# Patient Record
Sex: FEMALE | Race: WHITE | NOT HISPANIC OR LATINO | URBAN - METROPOLITAN AREA
[De-identification: names, ages, dates, MRNs, and addresses within clinical notes are randomized per-mention and may not be internally consistent; named-entity substitution may affect disease eponyms.]

---

## 2020-02-03 RX ORDER — APIXABAN 2.5 MG/1
1 TABLET, FILM COATED ORAL
Qty: 60 | Refills: 0
Start: 2020-02-03 | End: 2020-03-03

## 2020-02-24 ENCOUNTER — INPATIENT (INPATIENT)
Facility: HOSPITAL | Age: 55
LOS: 4 days | Discharge: HOME | DRG: 863 | End: 2020-02-29
Attending: INTERNAL MEDICINE | Admitting: HOSPITALIST
Payer: COMMERCIAL

## 2020-02-24 VITALS
TEMPERATURE: 99 F | SYSTOLIC BLOOD PRESSURE: 177 MMHG | DIASTOLIC BLOOD PRESSURE: 96 MMHG | RESPIRATION RATE: 17 BRPM | OXYGEN SATURATION: 97 % | HEIGHT: 63 IN | HEART RATE: 92 BPM | WEIGHT: 195.99 LBS

## 2020-02-24 LAB
ALBUMIN SERPL ELPH-MCNC: 4 G/DL — SIGNIFICANT CHANGE UP (ref 3.3–5)
ALP SERPL-CCNC: 75 U/L — SIGNIFICANT CHANGE UP (ref 40–120)
ALT FLD-CCNC: 16 U/L — SIGNIFICANT CHANGE UP (ref 10–45)
ANION GAP SERPL CALC-SCNC: 13 MMOL/L — SIGNIFICANT CHANGE UP (ref 5–17)
APTT BLD: 28.9 SEC — SIGNIFICANT CHANGE UP (ref 27.5–36.3)
AST SERPL-CCNC: 29 U/L — SIGNIFICANT CHANGE UP (ref 10–40)
BASE EXCESS BLDV CALC-SCNC: 3.6 MMOL/L — HIGH (ref -2–2)
BASOPHILS # BLD AUTO: 0.05 K/UL — SIGNIFICANT CHANGE UP (ref 0–0.2)
BASOPHILS NFR BLD AUTO: 0.4 % — SIGNIFICANT CHANGE UP (ref 0–2)
BILIRUB SERPL-MCNC: 0.3 MG/DL — SIGNIFICANT CHANGE UP (ref 0.2–1.2)
BUN SERPL-MCNC: 16 MG/DL — SIGNIFICANT CHANGE UP (ref 7–23)
CA-I SERPL-SCNC: 1.22 MMOL/L — SIGNIFICANT CHANGE UP (ref 1.12–1.3)
CALCIUM SERPL-MCNC: 9.5 MG/DL — SIGNIFICANT CHANGE UP (ref 8.4–10.5)
CHLORIDE BLDV-SCNC: 105 MMOL/L — SIGNIFICANT CHANGE UP (ref 96–108)
CHLORIDE SERPL-SCNC: 101 MMOL/L — SIGNIFICANT CHANGE UP (ref 96–108)
CO2 BLDV-SCNC: 30 MMOL/L — SIGNIFICANT CHANGE UP (ref 22–30)
CO2 SERPL-SCNC: 24 MMOL/L — SIGNIFICANT CHANGE UP (ref 22–31)
CREAT SERPL-MCNC: 0.69 MG/DL — SIGNIFICANT CHANGE UP (ref 0.5–1.3)
EOSINOPHIL # BLD AUTO: 0.2 K/UL — SIGNIFICANT CHANGE UP (ref 0–0.5)
EOSINOPHIL NFR BLD AUTO: 1.7 % — SIGNIFICANT CHANGE UP (ref 0–6)
GAS PNL BLDV: 141 MMOL/L — SIGNIFICANT CHANGE UP (ref 135–145)
GAS PNL BLDV: SIGNIFICANT CHANGE UP
GAS PNL BLDV: SIGNIFICANT CHANGE UP
GLUCOSE BLDV-MCNC: 114 MG/DL — HIGH (ref 70–99)
GLUCOSE SERPL-MCNC: 118 MG/DL — HIGH (ref 70–99)
HCO3 BLDV-SCNC: 29 MMOL/L — SIGNIFICANT CHANGE UP (ref 21–29)
HCT VFR BLD CALC: 42.1 % — SIGNIFICANT CHANGE UP (ref 34.5–45)
HCT VFR BLDA CALC: 44 % — SIGNIFICANT CHANGE UP (ref 39–50)
HGB BLD CALC-MCNC: 14.5 G/DL — SIGNIFICANT CHANGE UP (ref 11.5–15.5)
HGB BLD-MCNC: 14 G/DL — SIGNIFICANT CHANGE UP (ref 11.5–15.5)
IMM GRANULOCYTES NFR BLD AUTO: 0.3 % — SIGNIFICANT CHANGE UP (ref 0–1.5)
INR BLD: 0.98 RATIO — SIGNIFICANT CHANGE UP (ref 0.88–1.16)
LACTATE BLDV-MCNC: 0.9 MMOL/L — SIGNIFICANT CHANGE UP (ref 0.7–2)
LYMPHOCYTES # BLD AUTO: 25 % — SIGNIFICANT CHANGE UP (ref 13–44)
LYMPHOCYTES # BLD AUTO: 3 K/UL — SIGNIFICANT CHANGE UP (ref 1–3.3)
MCHC RBC-ENTMCNC: 28.4 PG — SIGNIFICANT CHANGE UP (ref 27–34)
MCHC RBC-ENTMCNC: 33.3 GM/DL — SIGNIFICANT CHANGE UP (ref 32–36)
MCV RBC AUTO: 85.4 FL — SIGNIFICANT CHANGE UP (ref 80–100)
MONOCYTES # BLD AUTO: 0.89 K/UL — SIGNIFICANT CHANGE UP (ref 0–0.9)
MONOCYTES NFR BLD AUTO: 7.4 % — SIGNIFICANT CHANGE UP (ref 2–14)
NEUTROPHILS # BLD AUTO: 7.83 K/UL — HIGH (ref 1.8–7.4)
NEUTROPHILS NFR BLD AUTO: 65.2 % — SIGNIFICANT CHANGE UP (ref 43–77)
NRBC # BLD: 0 /100 WBCS — SIGNIFICANT CHANGE UP (ref 0–0)
PCO2 BLDV: 48 MMHG — SIGNIFICANT CHANGE UP (ref 35–50)
PH BLDV: 7.4 — SIGNIFICANT CHANGE UP (ref 7.35–7.45)
PLATELET # BLD AUTO: 279 K/UL — SIGNIFICANT CHANGE UP (ref 150–400)
PO2 BLDV: 28 MMHG — SIGNIFICANT CHANGE UP (ref 25–45)
POTASSIUM BLDV-SCNC: 4 MMOL/L — SIGNIFICANT CHANGE UP (ref 3.5–5.3)
POTASSIUM SERPL-MCNC: 5.3 MMOL/L — SIGNIFICANT CHANGE UP (ref 3.5–5.3)
POTASSIUM SERPL-SCNC: 5.3 MMOL/L — SIGNIFICANT CHANGE UP (ref 3.5–5.3)
PROT SERPL-MCNC: 7.5 G/DL — SIGNIFICANT CHANGE UP (ref 6–8.3)
PROTHROM AB SERPL-ACNC: 11.3 SEC — SIGNIFICANT CHANGE UP (ref 10–12.9)
RBC # BLD: 4.93 M/UL — SIGNIFICANT CHANGE UP (ref 3.8–5.2)
RBC # FLD: 12.2 % — SIGNIFICANT CHANGE UP (ref 10.3–14.5)
SAO2 % BLDV: 49 % — LOW (ref 67–88)
SODIUM SERPL-SCNC: 138 MMOL/L — SIGNIFICANT CHANGE UP (ref 135–145)
WBC # BLD: 12.01 K/UL — HIGH (ref 3.8–10.5)
WBC # FLD AUTO: 12.01 K/UL — HIGH (ref 3.8–10.5)

## 2020-02-24 PROCEDURE — 93971 EXTREMITY STUDY: CPT | Mod: 26,RT

## 2020-02-24 PROCEDURE — 99285 EMERGENCY DEPT VISIT HI MDM: CPT

## 2020-02-24 RX ADMIN — Medication 100 MILLIGRAM(S): at 22:17

## 2020-02-24 NOTE — ED PROVIDER NOTE - PROGRESS NOTE DETAILS
Patient signed out to me by the prior attending.  The patient's disposition was discussed with the treating team and agreed upon.  Jose Alcantara M.D. (attending) will admit for cellulitis and dvt.  Dr. Norton aware of patient and received presentation of patient.  Based on patient's history and physical exam, as well as the results of today's workup, I feel that patient warrants admission to the hospital for further workup/evaluation and continued management. I discussed the findings of today's workup with the patient and addressed the patient's questions and concerns. The patient was agreeable with admission. Our team spoke with the inpatient receiving team who accepted the patient for admission and subsequently took over the patient's care at the time of admission.

## 2020-02-24 NOTE — ED PROVIDER NOTE - OBJECTIVE STATEMENT
55yo F with PMH HTN, ovarian CA, breast CA, Hashimoto's, recent PSH of RLE varicose vein surgery (2/19/2020) presents to the ED c/o worsening RLE redness, pain, and swelling since procedure. + associated chills today. Surgery conducted by Dr. Hao Rey in [place]. Pt had US conducted at [facility] yesterday that showed superficial thrombophlebitis, no DVT. Went to PMD and was started on Keflex 4x daily yesterday. Has been taking Ibuprofen and 325mg aspirin. Denies fever, n/v, CP, palpitations, SOB, numbness/tingling.   PMD: Sukhdev Stephenson 55yo F with PMH HTN, ovarian CA, breast CA, Hashimoto's, recent PSH of RLE varicose vein surgery (2/19/2020) presents to the ED c/o worsening RLE redness, pain, and swelling since procedure. + associated chills today. Surgery conducted by Dr. Hao Rey in [place]. Pt had US conducted at [facility] yesterday that showed superficial thrombophlebitis, no DVT. Went to PMD and was started on Keflex 4x daily yesterday. Has been taking Ibuprofen and took 325mg aspirin today. Denies fever, n/v, CP, palpitations, SOB, numbness/tingling.   PMD: Sukhdev Stephenson

## 2020-02-24 NOTE — ED ADULT NURSE NOTE - OBJECTIVE STATEMENT
54yr old female w/ pmhx of Ovarian CA, Breast CA, HTN, hashimoto's on synthroid presents to ED c/o infection. Pt states on wednesday 2/19 pt went to a vascular surgeon to have cosmetic procedure on her varicose veins on her R leg. Pt states ever since she left the surgeons office, she felt pain on her R leg at the site, and then over the week it progressed to redness, swelling, hardened skin, numbness of RLE hot to touch, and increasing pain. Pt states she went to the vascular surgeon today, and was told her leg was fine, but she decided to come to ED to have it assessed. upon assessment, edema, redness, noted on R Extremity, It is hot to the touch, and skin feels hardened upon palpation. Pt denies fevers, but states she did have the chills. Pt denies CP, SOB, NVD, Blurry vision, recent falls or trauma. Pt assessed by MD, bed is lowered and locked., will reassess

## 2020-02-24 NOTE — ED ADULT TRIAGE NOTE - CHIEF COMPLAINT QUOTE
Patient sent from vein clinic "to r/o cellulitis to RLE" - c/o RLE redness, pain and swelling  Patient had varicose vein surgery 2/19/2020

## 2020-02-24 NOTE — ED PROVIDER NOTE - LOWER EXTREMITY EXAM, RIGHT
+ large area of erythema/warmth and induration from R groin extending to proximal calf; no fluctuance, no open wounds, able to range hip/knee/ankle, sensation intact, DP pulse 2+

## 2020-02-24 NOTE — ED ADULT NURSE REASSESSMENT NOTE - NS ED NURSE REASSESS COMMENT FT1
Report received from Juan Jose PRUITT. Aox3, speaking in complete sentences. Unlabored, spontaneous respirations, NAD. R leg, swelling and redness. Awaiting results at this time.

## 2020-02-24 NOTE — ED PROVIDER NOTE - RAPID ASSESSMENT
Rogelio Montes De Oca rapid assessment documentation for Loren Helton (PA-C):    54 year old female with pshx varicose vein surgery (2/19/2020) presents to the ED c/o worsening RLE redness, pain and swelling s/p vein clinic referral to r/o cellulitis. Surgery conducted by Dr. Hao Rey. Pain developed soon after the surgery and swelling developed. Went for a check-up x3 days ago and was told that there were no blood clots but symptoms worsened. US conducted at [facility] on Sunday showed superficial thrombophlebitis, no DVT. Went to vein clinic and started on Keflex yesterday. Has been taking Ibuprofen and 325mg aspirin. Denies fever, chills, n/v. PMD: Sukhdev Stephenson. Rogelio Montes De Oca rapid assessment documentation for Loren Helton (PA-C):    54 year old female with pshx varicose vein surgery (2/19/2020) presents to the ED c/o worsening RLE redness, pain and swelling s/p vein clinic referral to r/o cellulitis. Surgery conducted by Dr. Hao Rey. Pain developed soon after the surgery and swelling developed. Pt had US conducted at [facility] on Sunday showed superficial thrombophlebitis, no DVT. Went to vein clinic and started on Keflex yesterday. Has been taking Ibuprofen for pain and took 325mg aspirin today. Denies fever, chills, n/v. PMD: Sukhdev Stephenson.

## 2020-02-24 NOTE — ED PROVIDER NOTE - ATTENDING CONTRIBUTION TO CARE
54F, pmh htn, ovarian ca, breast ca, s/p RLE varicose vein surgery 2/19, presents with RLE redness, pain, swelling, worsening over last few days. +Chills, no fever. No n/v. Pt saw pmd, who started on Keflex yesterday, has taken 4 doses. Also had US to RLE yesterday which revealed superficial thrombophlebitis. Also taking 325 asa and ibuprofen. Deneis cp, sob, cough, congestion, abd pain. Denies numbness, weakness to extremity.    PE: NAD, NCAT, MMM, Trachea midline, Normal conjunctiva, lungs CTAB, S1/S2 RRR, Normal perfusion, 2+ radial pulses bilat, Abdomen Soft, NTND, No rebound/guarding, No LE edema, No focal motor or sensory deficits. 2+ RLE DP pulse. RLE with +erythema, warmth, induration to R inner thigh extending to proximal calf. No crepitus, neg nikolsky. Sensation grossly intact, motor intact.    C/w developing cellulitis, not initially responding to Keflex. 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 Plan to obtain labs, repeat US RLE. Plan for CDU for IV abx, observation, serial re-evaluation.

## 2020-02-25 DIAGNOSIS — Z02.9 ENCOUNTER FOR ADMINISTRATIVE EXAMINATIONS, UNSPECIFIED: ICD-10-CM

## 2020-02-25 DIAGNOSIS — I82.411 ACUTE EMBOLISM AND THROMBOSIS OF RIGHT FEMORAL VEIN: ICD-10-CM

## 2020-02-25 DIAGNOSIS — I80.10 PHLEBITIS AND THROMBOPHLEBITIS OF UNSPECIFIED FEMORAL VEIN: ICD-10-CM

## 2020-02-25 DIAGNOSIS — L03.90 CELLULITIS, UNSPECIFIED: ICD-10-CM

## 2020-02-25 DIAGNOSIS — Z90.710 ACQUIRED ABSENCE OF BOTH CERVIX AND UTERUS: Chronic | ICD-10-CM

## 2020-02-25 DIAGNOSIS — Z90.12 ACQUIRED ABSENCE OF LEFT BREAST AND NIPPLE: Chronic | ICD-10-CM

## 2020-02-25 DIAGNOSIS — L03.115 CELLULITIS OF RIGHT LOWER LIMB: ICD-10-CM

## 2020-02-25 DIAGNOSIS — I10 ESSENTIAL (PRIMARY) HYPERTENSION: ICD-10-CM

## 2020-02-25 DIAGNOSIS — E03.8 OTHER SPECIFIED HYPOTHYROIDISM: ICD-10-CM

## 2020-02-25 LAB
ANION GAP SERPL CALC-SCNC: 11 MMOL/L — SIGNIFICANT CHANGE UP (ref 5–17)
BASOPHILS # BLD AUTO: 0.02 K/UL — SIGNIFICANT CHANGE UP (ref 0–0.2)
BASOPHILS NFR BLD AUTO: 0.2 % — SIGNIFICANT CHANGE UP (ref 0–2)
BUN SERPL-MCNC: 14 MG/DL — SIGNIFICANT CHANGE UP (ref 7–23)
CALCIUM SERPL-MCNC: 8.9 MG/DL — SIGNIFICANT CHANGE UP (ref 8.4–10.5)
CHLORIDE SERPL-SCNC: 104 MMOL/L — SIGNIFICANT CHANGE UP (ref 96–108)
CO2 SERPL-SCNC: 24 MMOL/L — SIGNIFICANT CHANGE UP (ref 22–31)
CREAT SERPL-MCNC: 0.57 MG/DL — SIGNIFICANT CHANGE UP (ref 0.5–1.3)
EOSINOPHIL # BLD AUTO: 0.15 K/UL — SIGNIFICANT CHANGE UP (ref 0–0.5)
EOSINOPHIL NFR BLD AUTO: 1.6 % — SIGNIFICANT CHANGE UP (ref 0–6)
GLUCOSE SERPL-MCNC: 105 MG/DL — HIGH (ref 70–99)
HCT VFR BLD CALC: 40.9 % — SIGNIFICANT CHANGE UP (ref 34.5–45)
HGB BLD-MCNC: 13.6 G/DL — SIGNIFICANT CHANGE UP (ref 11.5–15.5)
IMM GRANULOCYTES NFR BLD AUTO: 0.3 % — SIGNIFICANT CHANGE UP (ref 0–1.5)
LYMPHOCYTES # BLD AUTO: 2.59 K/UL — SIGNIFICANT CHANGE UP (ref 1–3.3)
LYMPHOCYTES # BLD AUTO: 27.3 % — SIGNIFICANT CHANGE UP (ref 13–44)
MAGNESIUM SERPL-MCNC: 2 MG/DL — SIGNIFICANT CHANGE UP (ref 1.6–2.6)
MCHC RBC-ENTMCNC: 28.5 PG — SIGNIFICANT CHANGE UP (ref 27–34)
MCHC RBC-ENTMCNC: 33.3 GM/DL — SIGNIFICANT CHANGE UP (ref 32–36)
MCV RBC AUTO: 85.7 FL — SIGNIFICANT CHANGE UP (ref 80–100)
MONOCYTES # BLD AUTO: 0.63 K/UL — SIGNIFICANT CHANGE UP (ref 0–0.9)
MONOCYTES NFR BLD AUTO: 6.6 % — SIGNIFICANT CHANGE UP (ref 2–14)
NEUTROPHILS # BLD AUTO: 6.08 K/UL — SIGNIFICANT CHANGE UP (ref 1.8–7.4)
NEUTROPHILS NFR BLD AUTO: 64 % — SIGNIFICANT CHANGE UP (ref 43–77)
NRBC # BLD: 0 /100 WBCS — SIGNIFICANT CHANGE UP (ref 0–0)
PHOSPHATE SERPL-MCNC: 3.7 MG/DL — SIGNIFICANT CHANGE UP (ref 2.5–4.5)
PLATELET # BLD AUTO: 252 K/UL — SIGNIFICANT CHANGE UP (ref 150–400)
POTASSIUM SERPL-MCNC: 4 MMOL/L — SIGNIFICANT CHANGE UP (ref 3.5–5.3)
POTASSIUM SERPL-SCNC: 4 MMOL/L — SIGNIFICANT CHANGE UP (ref 3.5–5.3)
RBC # BLD: 4.77 M/UL — SIGNIFICANT CHANGE UP (ref 3.8–5.2)
RBC # FLD: 12.2 % — SIGNIFICANT CHANGE UP (ref 10.3–14.5)
SODIUM SERPL-SCNC: 139 MMOL/L — SIGNIFICANT CHANGE UP (ref 135–145)
WBC # BLD: 9.5 K/UL — SIGNIFICANT CHANGE UP (ref 3.8–10.5)
WBC # FLD AUTO: 9.5 K/UL — SIGNIFICANT CHANGE UP (ref 3.8–10.5)

## 2020-02-25 PROCEDURE — 73700 CT LOWER EXTREMITY W/O DYE: CPT | Mod: 26,RT

## 2020-02-25 PROCEDURE — 99223 1ST HOSP IP/OBS HIGH 75: CPT

## 2020-02-25 RX ORDER — LEVOTHYROXINE SODIUM 125 MCG
125 TABLET ORAL DAILY
Refills: 0 | Status: DISCONTINUED | OUTPATIENT
Start: 2020-02-25 | End: 2020-02-29

## 2020-02-25 RX ORDER — VANCOMYCIN HCL 1 G
1000 VIAL (EA) INTRAVENOUS EVERY 12 HOURS
Refills: 0 | Status: DISCONTINUED | OUTPATIENT
Start: 2020-02-25 | End: 2020-02-29

## 2020-02-25 RX ORDER — INFLUENZA VIRUS VACCINE 15; 15; 15; 15 UG/.5ML; UG/.5ML; UG/.5ML; UG/.5ML
0.5 SUSPENSION INTRAMUSCULAR ONCE
Refills: 0 | Status: DISCONTINUED | OUTPATIENT
Start: 2020-02-25 | End: 2020-02-29

## 2020-02-25 RX ORDER — ENOXAPARIN SODIUM 100 MG/ML
90 INJECTION SUBCUTANEOUS ONCE
Refills: 0 | Status: COMPLETED | OUTPATIENT
Start: 2020-02-25 | End: 2020-02-25

## 2020-02-25 RX ORDER — APIXABAN 2.5 MG/1
10 TABLET, FILM COATED ORAL EVERY 12 HOURS
Refills: 0 | Status: DISCONTINUED | OUTPATIENT
Start: 2020-02-25 | End: 2020-02-29

## 2020-02-25 RX ADMIN — Medication 250 MILLIGRAM(S): at 18:08

## 2020-02-25 RX ADMIN — APIXABAN 10 MILLIGRAM(S): 2.5 TABLET, FILM COATED ORAL at 18:08

## 2020-02-25 RX ADMIN — Medication 2.5 MILLIGRAM(S): at 06:46

## 2020-02-25 RX ADMIN — ENOXAPARIN SODIUM 90 MILLIGRAM(S): 100 INJECTION SUBCUTANEOUS at 02:51

## 2020-02-25 RX ADMIN — Medication 250 MILLIGRAM(S): at 06:46

## 2020-02-25 RX ADMIN — Medication 125 MICROGRAM(S): at 06:46

## 2020-02-25 RX ADMIN — APIXABAN 10 MILLIGRAM(S): 2.5 TABLET, FILM COATED ORAL at 06:46

## 2020-02-25 NOTE — H&P ADULT - HISTORY OF PRESENT ILLNESS
54F with PMH of HTN, ovarian cancer (s/p total abdominal hysterectomy 2012), breast cancer s/p L mastectomy (2019) p/w RLE swelling and pain. Pt had laser therapy for varicose vein on RLE performed 2/19/20. In the days after, she started having severe pain in the R upper/medial thigh, which intensified over the next day and started radiating down the leg. She also started noticing erythema in the medical aspect of her R thigh which then progressed to swelling and induration. Pain is 10/10 at its worst, feels like a pressure and tearing kind of pain, worse with ambulation or attempted weight bearing; was taking ibuprofen at home for pain, but with minimal to no relief. Has never had similar symptoms in the past. Had an US performed on 2/23/20 which showed superficial thrombophlebitis and went to her PMD, who started stone keflex and aspirin. Pt took 4 doses of the abx, but symptoms continued to worsen and pt started having chills on day of presentation, which prompted ED visit. Denies fevers, CP, SOB, cough, +intermittent low abdominal pain (pt being worked up for cervical lesion at [facility]), no BRBPR/melena, no hematuria.

## 2020-02-25 NOTE — H&P ADULT - NSICDXFAMILYHX_GEN_ALL_CORE_FT
No pertinent family history in first degree relatives
Adequate: hears normal conversation without difficulty

## 2020-02-25 NOTE — H&P ADULT - NSHPREVIEWOFSYSTEMS_GEN_ALL_CORE
REVIEW OF SYSTEMS:    CONSTITUTIONAL: No weakness, no fevers, +chills  EYES/ENT: No visual changes;  no URI sx    NECK: No pain or stiffness  RESPIRATORY: No cough, wheezing, no shortness of breath  CARDIOVASCULAR: No chest pain or palpitations  GASTROINTESTINAL: no nausea, vomiting, +intermittent abdominal pain, no BRBPR  GENITOURINARY: no hematuria, no dysuria  NEUROLOGICAL: no numbness, no headaches, no confusion   MUSCULOSKELETAL: +RLE pain as per HPI,   SKIN: RLE redness + warmth   PSYCH: no anxiety, depression  HEME: no gum bleeding, no bruising

## 2020-02-25 NOTE — H&P ADULT - PROBLEM SELECTOR PLAN 2
pt with new DVT in RLE along saphenous extending to common femoral  s/p lovenox in ED, pt prefers oral AC - will start on eliquis now   pt with hx of uterine and breast ca and being worked up for cervical lesion at OSH; if possibly malignancy related, would be preferable to change to lovenox

## 2020-02-25 NOTE — H&P ADULT - NSHPLABSRESULTS_GEN_ALL_CORE
Labs, imaging personally reviewed and interpreted by me.                          14.0   12.01 )-----------( 279      ( 24 Feb 2020 20:12 )             42.1     02-24    138  |  101  |  16  ----------------------------<  118<H>  5.3   |  24  |  0.69    Ca    9.5      24 Feb 2020 20:12    TPro  7.5  /  Alb  4.0  /  TBili  0.3  /  DBili  x   /  AST  29  /  ALT  16  /  AlkPhos  75  02-24      PT/INR - ( 24 Feb 2020 20:12 )   PT: 11.3 sec;   INR: 0.98 ratio    PTT - ( 24 Feb 2020 20:12 )  PTT:28.9 sec      < from: [facility] Duplex Lower Ext Vein Scan, Right (02.24.20 @ 21:31) >  Occlusive thrombosis of the greater saphenous vein, extends to the junction with the common femoral vein..  Right femoral, and popliteal veins show normal compression with normal color and spectral Doppler waveform and normal response to augmentation. Visualized portions of the posterior tibialand peroneal veins show normal color and spectral flow.     IMPRESSION:  1.  Occlusive thrombosis of the greater saphenous vein extends to the junction with the common femoral vein.

## 2020-02-25 NOTE — ED ADULT NURSE REASSESSMENT NOTE - NS ED NURSE REASSESS COMMENT FT1
Results reviewed by MD Handley. Awaiting admission to hospital at this time, pt aware of plan of care at this time.

## 2020-02-25 NOTE — H&P ADULT - PROBLEM SELECTOR PLAN 1
pt with warmth, pain and erythema along medial aspect of RLE, along with area of DVT  mild leukocytosis and tachycardic on arrival, c/f cellulitis - on keflex as outpt without improvement   will place on vancomycin for now, monitor vanco levels every 4 doses   area demarcated, c/t monitor site

## 2020-02-25 NOTE — CHART NOTE - NSCHARTNOTEFT_GEN_A_CORE
Admitted overnight by hospitalist service   ID evaluation called  Vascular surgery evaluation called.  Dustin Gomez MD pager [phone]

## 2020-02-25 NOTE — CONSULT NOTE ADULT - ASSESSMENT
Assessment/Plan: 54y Female presents with right thigh swelling cellulitis in setting of recent varicose vein ablation found to have a right saphenous vein thrombus extending to the CF junction.   Cellulitis appears to be improving on antibiotics.   Patient already started on Eliquis.    - Continue anticoagulation given that the thrombus extends to the junction into the deep venous system  - Antibiotics per ID  - Can follow up with Dr. Isidro if she no longer wishes to see her own vascular surgeon     Pager [phone]

## 2020-02-25 NOTE — CONSULT NOTE ADULT - SUBJECTIVE AND OBJECTIVE BOX
HPI:   Patient is a 54y female with history of breast and ovarian cancer not active, she underwent laser varicose vein removal a week ago at an outpt vein center. She developed pain and redness of the right thigh very soon after, no improvement after a day of keflex hence came to the hospital yesterday. She was found to have a dvt of the greater saphenous vein. She has no fever but has chills. No cat or dog exposure. Her leg was fine prior to the laser procedure.     REVIEW OF SYSTEMS:  All other review of systems negative (Comprehensive ROS)    PAST MEDICAL & SURGICAL HISTORY:  Uterine cancer  Breast cancer  Hypothyroidism  HTN (hypertension)  S/P mastectomy, left  S/P hysterectomy      Allergies    No Known Allergies    Intolerances        Antimicrobials Day #  :1  vancomycin  IVPB 1000 milliGRAM(s) IV Intermittent every 12 hours    Other Medications:  apixaban 10 milliGRAM(s) Oral every 12 hours  enalapril 2.5 milliGRAM(s) Oral daily  influenza   Vaccine 0.5 milliLiter(s) IntraMuscular once  levothyroxine 125 MICROGram(s) Oral daily      FAMILY HISTORY:  No pertinent family history in first degree relatives      SOCIAL HISTORY:  Smoking: [ ]Yes [ ]No  ETOH: [ ]Yes [ ]No  Drug Use: [ ]Yes [ ]No   [ ] Single[ ]    T(F): 97.3 (02-25-20 @ 17:05), Max: 99 (02-24-20 @ 18:49)  HR: 76 (02-25-20 @ 17:05)  BP: 130/82 (02-25-20 @ 17:05)  RR: 18 (02-25-20 @ 17:05)  SpO2: 96% (02-25-20 @ 17:05)  Wt(kg): --    PHYSICAL EXAM:  General: alert, no acute distress  Eyes:  anicteric, no conjunctival injection, no discharge  Oropharynx: no lesions or injection 	  Neck: supple, without adenopathy  Lungs: clear to auscultation  Heart: regular rate and rhythm; no murmur, rubs or gallops  Abdomen: soft, nondistended, nontender, without mass or organomegaly  Skin: no lesions  Extremities: no clubbing, cyanosis,. right medial  thigh area is red and indurated with one area more swollen and indurated just above the knee. No crepitus, no fluctuance, no blisters, no adenopathy. full rom.   Neurologic: alert, oriented, moves all extremities    LAB RESULTS:                        13.6   9.50  )-----------( 252      ( 25 Feb 2020 05:19 )             40.9     02-25    139  |  104  |  14  ----------------------------<  105<H>  4.0   |  24  |  0.57    Ca    8.9      25 Feb 2020 05:19  Phos  3.7     02-25  Mg     2.0     02-25    TPro  7.5  /  Alb  4.0  /  TBili  0.3  /  DBili  x   /  AST  29  /  ALT  16  /  AlkPhos  75  02-24    LIVER FUNCTIONS - ( 24 Feb 2020 20:12 )  Alb: 4.0 g/dL / Pro: 7.5 g/dL / ALK PHOS: 75 U/L / ALT: 16 U/L / AST: 29 U/L / GGT: x               MICROBIOLOGY:  RECENT CULTURES:        RADIOLOGY REVIEWED:    < from: VA Duplex Lower Ext Vein Scan, Right (02.24.20 @ 21:31) >  IMPRESSION:    1.  Occlusive thrombosis of the greater saphenous vein extends to the junction with the common femoral vein.        < end of copied text >        Impression:  Patient s/p laser varicose vein removal a week ago and then developed redness and pain of the medial thigh very soon after with induration, no better on keflex and now found to have a dvt. She also could have some component of cellulitis too.     Recommendations:  continue iv vancomycin  apply warm compresses to the area  anticoagulation   ct leg  f/u cultures  vascular surgery evaluation  elevate leg

## 2020-02-25 NOTE — H&P ADULT - PROBLEM SELECTOR PLAN 5
1.  Name of PCP: Dr Stephenson  2.  PCP Contacted on Admission: [ ] Y    [ ] N    3.  PCP contacted at Discharge: [ ] Y    [ ] N    [ ] N/A  4.  Post-Discharge Appointment Date and Location:  5.  Summary of Handoff given to PCP:

## 2020-02-25 NOTE — CONSULT NOTE ADULT - SUBJECTIVE AND OBJECTIVE BOX
Vascular Surgery Consult Note  Pager [phone]    HPI:  54F with PMH of HTN, ovarian cancer (s/p total abdominal hysterectomy 2012), breast cancer s/p L mastectomy (2019) presents with RLE swelling and pain in the setting of recent right varicose vein ablation performed on 2/19/20. Vascular surgeon does not belong to [facility]. Following the procedure, she developed redness and pain in her right medial thigh. Was started on Keflex by her PMD, but came to the ED for worsening pain.   Vascular surgery consulted for right saphenous vein thrombus extending to the common femoral vein.       PAST MEDICAL & SURGICAL HISTORY:  Uterine cancer  Breast cancer  Hypothyroidism  HTN (hypertension)  S/P mastectomy, left  S/P hysterectomy      ALLERGIES:  NKA      HOME MEDICATIONS:  aspirin 325 mg oral tablet: 1 tab(s) orally once a day (25 Feb 2020 03:52)  cephalexin 500 mg oral tablet: 1 tab(s) orally 4 times a day (25 Feb 2020 03:52)  enalapril 2.5 mg oral tablet: 1 tab(s) orally once a day (25 Feb 2020 03:52)  Synthroid 125 mcg (0.125 mg) oral tablet: 1 tab(s) orally once a day (25 Feb 2020 03:52)    MEDICATIONS  (STANDING):  apixaban 10 milliGRAM(s) Oral every 12 hours  enalapril 2.5 milliGRAM(s) Oral daily  influenza   Vaccine 0.5 milliLiter(s) IntraMuscular once  levothyroxine 125 MICROGram(s) Oral daily  vancomycin  IVPB 1000 milliGRAM(s) IV Intermittent every 12 hours      SOCIAL HISTORY:  Denies smoking and ETOH use. Lives with family.      FAMILY HISTORY:  No pertinent history in first degree relatives.  ___________________________________________  REVIEW OF SYSTEMS:  Constitutional: No fevers, chills, no recent weight loss  ENMT: No changes in hearing, no changes in vision, no sore throat, no cough  Respiratory: No shortness of breath  Cardiovascular: No chest pain, palpitations  Gastrointestinal: No abdominal pain, no diarrhea/constipation  Genitourinary: No dysuria, frequency, or urgency    Extremities: No joint swelling, no limited range of movement  Neurological: No paresthesia  Skin: No rashes  ___________________________________________  PHYSICAL EXAM:  Vital Signs Last 24 Hrs  T(C): 36.3 (25 Feb 2020 17:05), Max: 36.9 (25 Feb 2020 12:35)  T(F): 97.3 (25 Feb 2020 17:05), Max: 98.4 (25 Feb 2020 12:35)  HR: 76 (25 Feb 2020 17:05) (74 - 85)  BP: 130/82 (25 Feb 2020 17:05) (124/71 - 134/84)  BP(mean): --  RR: 18 (25 Feb 2020 17:05) (16 - 18)  SpO2: 96% (25 Feb 2020 17:05) (94% - 96%)CAPILLARY BLOOD GLUCOSE      General: A&Ox3, NAD.  Neuro: Motor and sensory grossly intact with no focal deficits.  HEENT: Anicteric sclerae.  Respiratory: Unlabored breathing.   CVS: Regular rate and rhythm.  Abdomen: Soft, non-distended, non-tender.   Extremities: Right medial thigh cellulitis and induration receding from marked line. Thigh compartments soft.  Vascular: Right DP/PT pulse.  MSK: Intact ROM.  ____________________________________________  LABS:  CBC Full  -  ( 25 Feb 2020 05:19 )  WBC Count : 9.50 K/uL  RBC Count : 4.77 M/uL  Hemoglobin : 13.6 g/dL  Hematocrit : 40.9 %  Platelet Count - Automated : 252 K/uL  Mean Cell Volume : 85.7 fl  Mean Cell Hemoglobin : 28.5 pg  Mean Cell Hemoglobin Concentration : 33.3 gm/dL  Auto Neutrophil # : 6.08 K/uL  Auto Lymphocyte # : 2.59 K/uL  Auto Monocyte # : 0.63 K/uL  Auto Eosinophil # : 0.15 K/uL  Auto Basophil # : 0.02 K/uL  Auto Neutrophil % : 64.0 %  Auto Lymphocyte % : 27.3 %  Auto Monocyte % : 6.6 %  Auto Eosinophil % : 1.6 %  Auto Basophil % : 0.2 %    02-25    139  |  104  |  14  ----------------------------<  105<H>  4.0   |  24  |  0.57    Ca    8.9      25 Feb 2020 05:19  Phos  3.7     02-25  Mg     2.0     02-25    TPro  7.5  /  Alb  4.0  /  TBili  0.3  /  DBili  x   /  AST  29  /  ALT  16  /  AlkPhos  75  02-24    LIVER FUNCTIONS - ( 24 Feb 2020 20:12 )  Alb: 4.0 g/dL / Pro: 7.5 g/dL / ALK PHOS: 75 U/L / ALT: 16 U/L / AST: 29 U/L / GGT: x           PT/INR - ( 24 Feb 2020 20:12 )   PT: 11.3 sec;   INR: 0.98 ratio         PTT - ( 24 Feb 2020 20:12 )  PTT:28.9 sec

## 2020-02-26 ENCOUNTER — TRANSCRIPTION ENCOUNTER (OUTPATIENT)
Age: 55
End: 2020-02-26

## 2020-02-26 LAB
HCV AB S/CO SERPL IA: 0.2 S/CO — SIGNIFICANT CHANGE UP (ref 0–0.99)
HCV AB SERPL-IMP: SIGNIFICANT CHANGE UP
VANCOMYCIN TROUGH SERPL-MCNC: 5.2 UG/ML — LOW (ref 10–20)

## 2020-02-26 PROCEDURE — 99253 IP/OBS CNSLTJ NEW/EST LOW 45: CPT

## 2020-02-26 RX ORDER — ACETAMINOPHEN 500 MG
650 TABLET ORAL ONCE
Refills: 0 | Status: COMPLETED | OUTPATIENT
Start: 2020-02-26 | End: 2020-02-26

## 2020-02-26 RX ORDER — APIXABAN 2.5 MG/1
2 TABLET, FILM COATED ORAL
Qty: 0 | Refills: 0 | DISCHARGE
Start: 2020-02-26

## 2020-02-26 RX ADMIN — Medication 2.5 MILLIGRAM(S): at 06:41

## 2020-02-26 RX ADMIN — APIXABAN 10 MILLIGRAM(S): 2.5 TABLET, FILM COATED ORAL at 18:12

## 2020-02-26 RX ADMIN — Medication 650 MILLIGRAM(S): at 10:45

## 2020-02-26 RX ADMIN — Medication 250 MILLIGRAM(S): at 06:00

## 2020-02-26 RX ADMIN — Medication 650 MILLIGRAM(S): at 11:25

## 2020-02-26 RX ADMIN — APIXABAN 10 MILLIGRAM(S): 2.5 TABLET, FILM COATED ORAL at 06:41

## 2020-02-26 RX ADMIN — Medication 125 MICROGRAM(S): at 05:59

## 2020-02-26 RX ADMIN — Medication 250 MILLIGRAM(S): at 18:12

## 2020-02-26 NOTE — DISCHARGE NOTE PROVIDER - HOSPITAL COURSE
54F with PMH of HTN, ovarian cancer (s/p total abdominal hysterectomy 2012), breast cancer s/p L mastectomy (2019) p/w RLE swelling and pain. Pt had laser therapy for varicose vein on RLE performed 2/19/20. In the days after, she started having severe pain in the R upper/medial thigh, which intensified over the next day and started radiating down the leg. She also started noticing erythema in the medical aspect of her R thigh which then progressed to swelling and induration. Pain is 10/10 at its worst, feels like a pressure and tearing kind of pain, worse with ambulation or attempted weight bearing; was taking ibuprofen at home for pain, but with minimal to no relief. Has never had similar symptoms in the past. Had an US performed on 2/23/20 which showed superficial thrombophlebitis and went to her PMD, who started stone keflex and aspirin. Pt took 4 doses of the abx, but symptoms continued to worsen and pt started having chills on day of presentation, which prompted ED visit. Denies fevers, CP, SOB, cough, +intermittent low abdominal pain (pt being worked up for cervical lesion at [facility]), no BRBPR/melena, no hematuria.      Admitted with right thigh swelling cellulitis in setting of recent varicose vein ablation found to have a right saphenous vein thrombus extending to the CF junction, associated with right thigh erythema    Seen by vascular    - Patient was on Eliquis post procedure and should continue on it    - Recommend repeat duplex in 1 week to assess DVT    - From vascular standpoint, erythema more likely related to ablation, recommend monitoring off abx    - Patient should follow up with Dr. Isidro in 1 week next Friday if she no longer wishes to see her own vascular surgeon 54F with PMH of HTN, ovarian cancer (s/p total abdominal hysterectomy 2012), breast cancer s/p L mastectomy (2019) p/w RLE swelling and pain. Pt had laser therapy for varicose vein on RLE performed 2/19/20. In the days after, she started having severe pain in the R upper/medial thigh, which intensified over the next day and started radiating down the leg. She also started noticing erythema in the medical aspect of her R thigh which then progressed to swelling and induration. Pain is 10/10 at its worst, feels like a pressure and tearing kind of pain, worse with ambulation or attempted weight bearing; was taking ibuprofen at home for pain, but with minimal to no relief. Has never had similar symptoms in the past. Had an US performed on 2/23/20 which showed superficial thrombophlebitis and went to her PMD, who started stone keflex and aspirin. Pt took 4 doses of the abx, but symptoms continued to worsen and pt started having chills on day of presentation, which prompted ED visit. Denies fevers, CP, SOB, cough, +intermittent low abdominal pain (pt being worked up for cervical lesion at [facility]), no BRBPR/melena, no hematuria.      Admitted with right thigh swelling cellulitis in setting of recent varicose vein ablation found to have a right saphenous vein thrombus extending to the CF junction, associated with right thigh erythema    ID reccs doxycline until 3/5/20    Seen by vascular    - Patient was on Eliquis post procedure and should continue on it    - Recommend repeat duplex in 1 week to assess DVT    - From vascular standpoint, erythema more likely related to ablation, recommend monitoring off abx    - Patient should follow up with Dr. Isidro in 1 week next Friday if she no longer wishes to see her own vascular surgeon

## 2020-02-26 NOTE — PROGRESS NOTE ADULT - ASSESSMENT
54F with PMH of HTN, ovarian cancer (s/p total abdominal hysterectomy 2012), breast cancer s/p L mastectomy (2019) p/w RLE swelling and pain, admitted with acute DVT and overlying cellulitis.     Cellulitis of right lower extremity.   - antibiotics  - ID follow.   - area demarcated, c/t monitor site.     Acute deep vein thrombosis (DVT) of femoral vein of right lower extremity.  - pt with new DVT in RLE along saphenous extending to common femoral  - s/p lovenox in ED, pt prefers oral AC . Continue Eliquis     hx of uterine and breast ca and being worked up for cervical lesion  - MRI Abdomen and Pelvis pending      Hypothyroidism due to Hashimoto's thyroiditis.  - c/w home dose synthroid 125mcg.     Essential hypertension.  - BP currently controlled   - c/w home medications - enalapril 2.5mg daily.   Dustin Gomez MD pager [phone]

## 2020-02-26 NOTE — PROGRESS NOTE ADULT - SUBJECTIVE AND OBJECTIVE BOX
CC: f/u for right leg cellulitis     Patient reports that her right leg pain, swelling & redness is slowly getting better     REVIEW OF SYSTEMS:  All other review of systems negative (Comprehensive ROS)    Antimicrobials Day #  :  2  vancomycin  IVPB 1000 milliGRAM(s) IV Intermittent every 12 hours    Other Medications Reviewed    T(F): 97.7 (02-26-20 @ 04:38), Max: 98.4 (02-25-20 @ 12:35)  HR: 79 (02-26-20 @ 04:38)  BP: 159/90 (02-26-20 @ 04:38)  RR: 18 (02-26-20 @ 04:38)  SpO2: 97% (02-26-20 @ 04:38)  Wt(kg): --    PHYSICAL EXAM:  General: alert, no acute distress  Eyes:  anicteric, no conjunctival injection, no discharge  Neck: supple  Lungs: clear to auscultation  Heart: regular rate and rhythm; no murmurs  Abdomen: soft, nondistended, nontender  Skin: right medial  thigh area is red, indurated & warm just above the knee  Neurologic: alert, oriented, moves all extremities    LAB RESULTS:                        13.6   9.50  )-----------( 252      ( 25 Feb 2020 05:19 )             40.9     02-25    139  |  104  |  14  ----------------------------<  105<H>  4.0   |  24  |  0.57    Ca    8.9      25 Feb 2020 05:19  Phos  3.7     02-25  Mg     2.0     02-25    TPro  7.5  /  Alb  4.0  /  TBili  0.3  /  DBili  x   /  AST  29  /  ALT  16  /  AlkPhos  75  02-24    LIVER FUNCTIONS - ( 24 Feb 2020 20:12 )  Alb: 4.0 g/dL / Pro: 7.5 g/dL / ALK PHOS: 75 U/L / ALT: 16 U/L / AST: 29 U/L / GGT: x             MICROBIOLOGY:  RECENT CULTURES:  02-24 @ 23:02 .Blood Blood     No growth to date.        RADIOLOGY REVIEWED:  CT Lower Extremity No Cont, Right (02.25.20 @ 22:25)   Stranding around the greater saphenous vein which appears expanded, consistent with the history of occlusive thrombus    VA Duplex Lower Ext Vein Scan, Right (02.24.20 @ 21:31)   Occlusive thrombosis of the greater saphenous vein extends to the junction with the common femoral vein.

## 2020-02-26 NOTE — PROGRESS NOTE ADULT - SUBJECTIVE AND OBJECTIVE BOX
VASCULAR SURGERY DAILY PROGRESS NOTE:    Subjective:  Patient seen and examined. Reports pain is improved.     Exam:  Gen: NAD, resting in bed  Resp: Airway patent, non-labored respirations  Abd: Soft, ND, NT  Ext: WWP, RLE medial thigh erythematous, tender, 2+ DP/PT      Vital Signs Last 24 Hrs  T(C): 36.5 (26 Feb 2020 04:38), Max: 36.9 (25 Feb 2020 12:35)  T(F): 97.7 (26 Feb 2020 04:38), Max: 98.4 (25 Feb 2020 12:35)  HR: 79 (26 Feb 2020 04:38) (76 - 85)  BP: 159/90 (26 Feb 2020 04:38) (124/71 - 159/90)  BP(mean): --  RR: 18 (26 Feb 2020 04:38) (17 - 18)  SpO2: 97% (26 Feb 2020 04:38) (95% - 97%)    I&O's Detail      Daily Height in cm: 160.02 (25 Feb 2020 17:05)    Daily     MEDICATIONS  (STANDING):  apixaban 10 milliGRAM(s) Oral every 12 hours  enalapril 2.5 milliGRAM(s) Oral daily  influenza   Vaccine 0.5 milliLiter(s) IntraMuscular once  levothyroxine 125 MICROGram(s) Oral daily  vancomycin  IVPB 1000 milliGRAM(s) IV Intermittent every 12 hours    MEDICATIONS  (PRN):      LABS:                        13.6   9.50  )-----------( 252      ( 25 Feb 2020 05:19 )             40.9     02-25    139  |  104  |  14  ----------------------------<  105<H>  4.0   |  24  |  0.57    Ca    8.9      25 Feb 2020 05:19  Phos  3.7     02-25  Mg     2.0     02-25    TPro  7.5  /  Alb  4.0  /  TBili  0.3  /  DBili  x   /  AST  29  /  ALT  16  /  AlkPhos  75  02-24    PT/INR - ( 24 Feb 2020 20:12 )   PT: 11.3 sec;   INR: 0.98 ratio         PTT - ( 24 Feb 2020 20:12 )  PTT:28.9 sec

## 2020-02-26 NOTE — DISCHARGE NOTE PROVIDER - NSDCCPCAREPLAN_GEN_ALL_CORE_FT
PRINCIPAL DISCHARGE DIAGNOSIS  Diagnosis: Cellulitis  Assessment and Plan of Treatment: Take antibotics as directed  Follow-up with your Primary Care Doctor      SECONDARY DISCHARGE DIAGNOSES  Diagnosis: Acute deep vein thrombosis (DVT) of femoral vein of right lower extremity  Assessment and Plan of Treatment: Take eliquis as directed  Follow-up with your Primary care doctor or specialist

## 2020-02-26 NOTE — DISCHARGE NOTE PROVIDER - NSDCCAREPROVSEEN_GEN_ALL_CORE_FT
Dustin Gomez Dustin Gomez  [facility] Medicine, Advance PracticeTeam

## 2020-02-26 NOTE — PROGRESS NOTE ADULT - ASSESSMENT
54y female with history of breast and ovarian cancer not active,   S/p laser varicose vein removal a week PTA, complicated by redness and pain of the medial thigh very soon after with induration, no response to PO keflex  Presented to the ER & found to have RLE DVT.  Exam although also suggestive of some component of cellulitis.   Vascular surgery input noted - redness felt to more from ablation rather than infection    Recommendations:  Continue IV vancomycin for now - I am reluctant to stop antibiotics as redness & edema both appear to be decreasing on antibiotics, so I do suspect a component of cellulitis   Would be able to switch to PO Doxycycline, when she is ready for d/c home  Continue anticoagulation   Follow up with vascular surgery   Elevate leg

## 2020-02-26 NOTE — DISCHARGE NOTE PROVIDER - NSDCMRMEDTOKEN_GEN_ALL_CORE_FT
apixaban 5 mg oral tablet: 2 tab(s) orally every 12 hours  aspirin 325 mg oral tablet: 1 tab(s) orally once a day  cephalexin 500 mg oral tablet: 1 tab(s) orally 4 times a day  enalapril 2.5 mg oral tablet: 1 tab(s) orally once a day  Synthroid 125 mcg (0.125 mg) oral tablet: 1 tab(s) orally once a day apixaban 5 mg oral tablet: 2 tab(s) orally every 12 hours until 3/2/20  (last dose on 3/2/20)  aspirin 325 mg oral tablet: 1 tab(s) orally once a day  doxycycline monohydrate 100 mg oral capsule: 1 cap(s) orally every 12 hours last dose 3/5/20  Eliquis 5 mg oral tablet: 1 tab(s) orally 2 times a day to be started on 3/3/20  enalapril 2.5 mg oral tablet: 1 tab(s) orally once a day  Synthroid 125 mcg (0.125 mg) oral tablet: 1 tab(s) orally once a day

## 2020-02-26 NOTE — PROGRESS NOTE ADULT - ASSESSMENT
Assessment/Plan: 54y Female presents with right thigh swelling cellulitis in setting of recent varicose vein ablation found to have a right saphenous vein thrombus extending to the CF junction.   Cellulitis appears to be improving on antibiotics, no further extension this AM    - Continue anticoagulation given that the thrombus extends to the junction into the deep venous system  - Antibiotics per ID, currently on vanc  - Can follow up with Dr. Isidro if she no longer wishes to see her own vascular surgeon     VASCULAR SURGERY  Pager [phone] Assessment/Plan: 54y Female presents with right thigh swelling cellulitis in setting of recent varicose vein ablation found to have a right saphenous vein thrombus extending to the CF junction, associated with right thigh erythema    - Patient was on Eliquis post procedure and should continue on it  - Recommend repeat duplex in 1 week to assess DVT  - From vascular standpoint, erythema more likely related to ablation, recommend monitoring off abx  - Patient should follow up with Dr. Isidro in 1 week next Friday if she no longer wishes to see her own vascular surgeon     VASCULAR SURGERY  Pager [phone]

## 2020-02-26 NOTE — DISCHARGE NOTE PROVIDER - PROVIDER TOKENS
PROVIDER:[TOKEN:[[id number]]],FREE:[LAST:[PMD as soon as possible],PHONE:[(   )    -],FAX:[(   )    -]]

## 2020-02-26 NOTE — DISCHARGE NOTE PROVIDER - CARE PROVIDER_API CALL
Andrae Isidro)  Vascular Surgery  [facility], [address] [place] [address]  Phone: [phone]  Fax: [phone]  Follow Up Time:     PMD as soon as possible,   Phone: (   )    -  Fax: (   )    -  Follow Up Time:

## 2020-02-26 NOTE — PROGRESS NOTE ADULT - SUBJECTIVE AND OBJECTIVE BOX
Patient is a 54y old  Female who presents with a chief complaint of RLE swelling and pain (26 Feb 2020 12:07)      SUBJECTIVE / OVERNIGHT EVENTS: No new complaints.   Review of Systems  chest pain no  palpitations no  sob no  nausea no  headache no    MEDICATIONS  (STANDING):  apixaban 10 milliGRAM(s) Oral every 12 hours  enalapril 2.5 milliGRAM(s) Oral daily  influenza   Vaccine 0.5 milliLiter(s) IntraMuscular once  levothyroxine 125 MICROGram(s) Oral daily  vancomycin  IVPB 1000 milliGRAM(s) IV Intermittent every 12 hours    MEDICATIONS  (PRN):      Vital Signs Last 24 Hrs  T(C): 36.3 (26 Feb 2020 21:27), Max: 36.7 (26 Feb 2020 12:18)  T(F): 97.3 (26 Feb 2020 21:27), Max: 98.1 (26 Feb 2020 12:18)  HR: 74 (26 Feb 2020 21:27) (74 - 88)  BP: 149/87 (26 Feb 2020 21:27) (149/87 - 165/74)  BP(mean): --  RR: 18 (26 Feb 2020 21:27) (18 - 18)  SpO2: 94% (26 Feb 2020 21:27) (94% - 97%)    PHYSICAL EXAM:  GENERAL: NAD, well-developed  HEAD:  Atraumatic, Normocephalic  EYES: EOMI, PERRLA, conjunctiva and sclera clear  NECK: Supple, No JVD  CHEST/LUNG: Clear to auscultation bilaterally; No wheeze  HEART: Regular rate and rhythm; No murmurs, rubs, or gallops  ABDOMEN: Soft, Nontender, Nondistended; Bowel sounds present  EXTREMITIES:  R thigh with area of induration and erythema improving   PSYCH: AAOx3  NEUROLOGY: non-focal  SKIN: No rashes or lesions    LABS:                        13.6   9.50  )-----------( 252      ( 25 Feb 2020 05:19 )             40.9     02-25    139  |  104  |  14  ----------------------------<  105<H>  4.0   |  24  |  0.57    Ca    8.9      25 Feb 2020 05:19  Phos  3.7     02-25  Mg     2.0     02-25                Culture - Blood (collected 24 Feb 2020 23:02)  Source: .Blood Blood  Preliminary Report (26 Feb 2020 01:02):    No growth to date.    Culture - Blood (collected 24 Feb 2020 23:02)  Source: .Blood Blood  Preliminary Report (26 Feb 2020 01:02):    No growth to date.        RADIOLOGY & ADDITIONAL TESTS:    Imaging Personally Reviewed:  < from: CT Lower Extremity No Cont, Right (02.25.20 @ 22:25) >  IMPRESSION:    Stranding around the greater saphenous vein which appears expanded, consistent with the history of occlusive thrombus. This was better evaluated on the prior ultrasound.    < end of copied text >    Consultant(s) Notes Reviewed:      Care Discussed with Consultants/Other Providers:

## 2020-02-26 NOTE — DISCHARGE NOTE PROVIDER - CARE PROVIDERS DIRECT ADDRESSES
[email or web address],DirectAddress_Unknown

## 2020-02-27 RX ORDER — ACETAMINOPHEN 500 MG
650 TABLET ORAL EVERY 6 HOURS
Refills: 0 | Status: DISCONTINUED | OUTPATIENT
Start: 2020-02-27 | End: 2020-02-29

## 2020-02-27 RX ADMIN — Medication 250 MILLIGRAM(S): at 05:54

## 2020-02-27 RX ADMIN — Medication 2.5 MILLIGRAM(S): at 05:54

## 2020-02-27 RX ADMIN — Medication 250 MILLIGRAM(S): at 17:39

## 2020-02-27 RX ADMIN — Medication 650 MILLIGRAM(S): at 08:39

## 2020-02-27 RX ADMIN — Medication 650 MILLIGRAM(S): at 09:09

## 2020-02-27 RX ADMIN — Medication 125 MICROGRAM(S): at 05:53

## 2020-02-27 RX ADMIN — APIXABAN 10 MILLIGRAM(S): 2.5 TABLET, FILM COATED ORAL at 17:38

## 2020-02-27 RX ADMIN — APIXABAN 10 MILLIGRAM(S): 2.5 TABLET, FILM COATED ORAL at 05:54

## 2020-02-27 NOTE — PROGRESS NOTE ADULT - SUBJECTIVE AND OBJECTIVE BOX
CC: f/u for RLE cellulitis     Patient reports that RLE pain is slowly improving     REVIEW OF SYSTEMS:  All other review of systems negative (Comprehensive ROS)    Antimicrobials Day #  : 3  vancomycin  IVPB 1000 milliGRAM(s) IV Intermittent every 12 hours    Other Medications Reviewed    T(F): 97.7 (02-27-20 @ 05:10), Max: 97.7 (02-27-20 @ 05:10)  HR: 93 (02-27-20 @ 05:10)  BP: 159/82 (02-27-20 @ 05:10)  RR: 18 (02-27-20 @ 05:10)  SpO2: 96% (02-27-20 @ 05:10)  Wt(kg): --    PHYSICAL EXAM:  General: alert, no acute distress  Eyes:  anicteric, no conjunctival injection, no discharge  Neck: supple  Lungs: clear to auscultation  Heart: regular rate and rhythm; no murmur  Abdomen: soft, nondistended, nontender  Extremities: no clubbing, cyanosis, or edema                   right medial  thigh area is red, indurated & warm just above the knee & improving daily  Neurologic: alert, oriented, moves all extremities    LAB RESULTS:      MICROBIOLOGY:  RECENT CULTURES:  02-24 @ 23:02 .Blood Blood     No growth to date.        RADIOLOGY REVIEWED:

## 2020-02-27 NOTE — PROGRESS NOTE ADULT - ASSESSMENT
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  Exam also suggestive of cellulitis.   Vascular surgery input noted - redness felt to more from ablation rather than infection  However, clinically, suspect cellulitis; redness, heat & edema appear to be decreasing on antibiotics, so I do suspect a component of cellulitis     Recommendations:  Continue IV vancomycin - low trough is acceptable - since pt responding  Would be able to switch to PO Doxycycline, when she is ready for d/c home  Continue anticoagulation   Follow up with vascular surgery a outpatient  Elevate leg

## 2020-02-27 NOTE — PROGRESS NOTE ADULT - SUBJECTIVE AND OBJECTIVE BOX
Patient is a 54y old  Female who presents with a chief complaint of RLE swelling and pain (27 Feb 2020 13:24)      SUBJECTIVE / OVERNIGHT EVENTS: feels better  Review of Systems  chest pain no  palpitations no  sob no  nausea no  headache no    MEDICATIONS  (STANDING):  apixaban 10 milliGRAM(s) Oral every 12 hours  enalapril 2.5 milliGRAM(s) Oral daily  influenza   Vaccine 0.5 milliLiter(s) IntraMuscular once  levothyroxine 125 MICROGram(s) Oral daily  vancomycin  IVPB 1000 milliGRAM(s) IV Intermittent every 12 hours    MEDICATIONS  (PRN):  acetaminophen   Tablet .. 650 milliGRAM(s) Oral every 6 hours PRN Mild Pain (1 - 3)      Vital Signs Last 24 Hrs  T(C): 36.9 (27 Feb 2020 14:39), Max: 36.9 (27 Feb 2020 14:39)  T(F): 98.5 (27 Feb 2020 14:39), Max: 98.5 (27 Feb 2020 14:39)  HR: 78 (27 Feb 2020 14:39) (74 - 93)  BP: 135/85 (27 Feb 2020 14:39) (135/85 - 159/82)  BP(mean): --  RR: 18 (27 Feb 2020 14:39) (18 - 18)  SpO2: 94% (27 Feb 2020 14:39) (94% - 96%)    PHYSICAL EXAM:  GENERAL: NAD, well-developed  HEAD:  Atraumatic, Normocephalic  EYES: EOMI, PERRLA, conjunctiva and sclera clear  NECK: Supple, No JVD  CHEST/LUNG: Clear to auscultation bilaterally; No wheeze  HEART: Regular rate and rhythm; No murmurs, rubs, or gallops  ABDOMEN: Soft, Nontender, Nondistended; Bowel sounds present  EXTREMITIES: R leg with thigh erythema and induration improving   PSYCH: AAOx3  NEUROLOGY: non-focal  SKIN: No rashes or lesions    LABS:                    Culture - Blood (collected 24 Feb 2020 23:02)  Source: .Blood Blood  Preliminary Report (26 Feb 2020 01:02):    No growth to date.    Culture - Blood (collected 24 Feb 2020 23:02)  Source: .Blood Blood  Preliminary Report (26 Feb 2020 01:02):    No growth to date.        RADIOLOGY & ADDITIONAL TESTS:    Imaging Personally Reviewed:    Consultant(s) Notes Reviewed:      Care Discussed with Consultants/Other Providers:

## 2020-02-27 NOTE — PROGRESS NOTE ADULT - ASSESSMENT
54F with PMH of HTN, ovarian cancer (s/p total abdominal hysterectomy 2012), breast cancer s/p L mastectomy (2019) p/w RLE swelling and pain, admitted with acute DVT and overlying cellulitis.     Cellulitis of right lower extremity.   - antibiotics  - ID follow.   - area demarcated, c/t monitor site.     Acute deep vein thrombosis (DVT) of femoral vein of right lower extremity.  - pt with new DVT in RLE along saphenous extending to common femoral  - s/p lovenox in ED, pt prefers oral AC . Continue Eliquis     hx of uterine and breast ca and being worked up for cervical lesion  - MRI Abdomen and Pelvis as OTP.      Hypothyroidism due to Hashimoto's thyroiditis.  - c/w home dose synthroid 125mcg.     Essential hypertension.  - BP currently controlled   - c/w home medications - enalapril 2.5mg daily.   Dustin Gomez MD pager [phone]

## 2020-02-28 LAB — VANCOMYCIN TROUGH SERPL-MCNC: 8.6 UG/ML — LOW (ref 10–20)

## 2020-02-28 RX ADMIN — Medication 250 MILLIGRAM(S): at 17:54

## 2020-02-28 RX ADMIN — Medication 650 MILLIGRAM(S): at 11:49

## 2020-02-28 RX ADMIN — Medication 650 MILLIGRAM(S): at 17:59

## 2020-02-28 RX ADMIN — Medication 650 MILLIGRAM(S): at 12:30

## 2020-02-28 RX ADMIN — Medication 2.5 MILLIGRAM(S): at 05:19

## 2020-02-28 RX ADMIN — APIXABAN 10 MILLIGRAM(S): 2.5 TABLET, FILM COATED ORAL at 05:19

## 2020-02-28 RX ADMIN — Medication 250 MILLIGRAM(S): at 05:17

## 2020-02-28 RX ADMIN — APIXABAN 10 MILLIGRAM(S): 2.5 TABLET, FILM COATED ORAL at 17:54

## 2020-02-28 RX ADMIN — Medication 125 MICROGRAM(S): at 05:19

## 2020-02-28 NOTE — CHART NOTE - NSCHARTNOTEFT_GEN_A_CORE
MARK TAYLOR    Notified  ID to follow up with Vascular for patient right lower extremities pain   Patient reports increase pain with palpate and ambulation due to pain / burning   report numbness at times   positive pulses Dorsal pedi   positive sensation   HPI:  54F with PMH of HTN, ovarian cancer (s/p total abdominal hysterectomy 2012), breast cancer s/p L mastectomy (2019) p/w RLE swelling and pain. Pt had laser therapy for varicose vein on RLE performed 2/19/20. In the days after, she started having severe pain in the R upper/medial thigh, which intensified over the next day and started radiating down the leg. She also started noticing erythema in the medical aspect of her R thigh which then progressed to swelling and induration. Pain is 10/10 at its worst, feels like a pressure and tearing kind of pain, worse with ambulation or attempted weight bearing; was taking ibuprofen at home for pain, but with minimal to no relief. Has never had similar symptoms in the past. Had an US performed on 2/23/20 which showed superficial thrombophlebitis and went to her PMD, who started stone keflex and aspirin. Pt took 4 doses of the abx, but symptoms continued to worsen and pt started having chills on day of presentation, which prompted ED visit. Denies fevers, CP, SOB, cough, +intermittent low abdominal pain (pt being worked up for cervical lesion at [facility]), no BRBPR/melena, no hematuria. (25 Feb 2020 03:52)    Interventions taken   awaiting call back from Vascular #[id number]   c/w Vancomycin   will report off to night NP /Pa   Discussed with Dr Gomez agreed with above plan                           Liliane Leon NP-C

## 2020-02-28 NOTE — PROGRESS NOTE ADULT - SUBJECTIVE AND OBJECTIVE BOX
Patient is a 54y old  Female who presents with a chief complaint of RLE swelling and pain (28 Feb 2020 16:13)      SUBJECTIVE / OVERNIGHT EVENTS: c/o pain R Thigh area  Review of Systems  chest pain no  palpitations no  sob no  nausea no  headache no    MEDICATIONS  (STANDING):  apixaban 10 milliGRAM(s) Oral every 12 hours  enalapril 2.5 milliGRAM(s) Oral daily  influenza   Vaccine 0.5 milliLiter(s) IntraMuscular once  levothyroxine 125 MICROGram(s) Oral daily  vancomycin  IVPB 1000 milliGRAM(s) IV Intermittent every 12 hours    MEDICATIONS  (PRN):  acetaminophen   Tablet .. 650 milliGRAM(s) Oral every 6 hours PRN Mild Pain (1 - 3)      Vital Signs Last 24 Hrs  T(C): 36.4 (28 Feb 2020 20:49), Max: 36.5 (28 Feb 2020 12:30)  T(F): 97.6 (28 Feb 2020 20:49), Max: 97.7 (28 Feb 2020 12:30)  HR: 67 (28 Feb 2020 20:49) (67 - 93)  BP: 127/77 (28 Feb 2020 20:49) (126/78 - 166/91)  BP(mean): --  RR: 18 (28 Feb 2020 20:49) (18 - 18)  SpO2: 95% (28 Feb 2020 20:49) (94% - 96%)    PHYSICAL EXAM:  GENERAL: NAD, well-developed  HEAD:  Atraumatic, Normocephalic  EYES: EOMI, PERRLA, conjunctiva and sclera clear  NECK: Supple, No JVD  CHEST/LUNG: Clear to auscultation bilaterally; No wheeze  HEART: Regular rate and rhythm; No murmurs, rubs, or gallops  ABDOMEN: Soft, Nontender, Nondistended; Bowel sounds present  EXTREMITIES:  improving erythema R thigh. Palpable cord.  PSYCH: AAOx3  NEUROLOGY: non-focal  SKIN: No rashes or lesions    LABS:                      RADIOLOGY & ADDITIONAL TESTS:    Imaging Personally Reviewed:    Consultant(s) Notes Reviewed:      Care Discussed with Consultants/Other Providers:

## 2020-02-28 NOTE — PROGRESS NOTE ADULT - ASSESSMENT
54F with PMH of HTN, ovarian cancer (s/p total abdominal hysterectomy 2012), breast cancer s/p L mastectomy (2019) p/w RLE swelling and pain, admitted with acute DVT and overlying cellulitis.     Cellulitis of right lower extremity.   - antibiotics  - ID follow.   - area demarcated, c/t monitor site.     Acute deep vein thrombosis (DVT) of femoral vein of right lower extremity.  - pt with new DVT in RLE along saphenous extending to common femoral  - s/p lovenox in ED, pt prefers oral AC . Continue Eliquis     hx of uterine and breast ca and being worked up for cervical lesion  - MRI Abdomen and Pelvis as OTP.      Hypothyroidism due to Hashimoto's thyroiditis.  - c/w home dose synthroid 125mcg.     Essential hypertension.  - BP currently controlled   - c/w home medications - enalapril 2.5mg daily.     Labs in am.  Dusitn Gomez MD pager [phone]

## 2020-02-29 ENCOUNTER — TRANSCRIPTION ENCOUNTER (OUTPATIENT)
Age: 55
End: 2020-02-29

## 2020-02-29 VITALS
TEMPERATURE: 98 F | RESPIRATION RATE: 18 BRPM | DIASTOLIC BLOOD PRESSURE: 88 MMHG | SYSTOLIC BLOOD PRESSURE: 151 MMHG | OXYGEN SATURATION: 96 % | HEART RATE: 83 BPM

## 2020-02-29 LAB
ANION GAP SERPL CALC-SCNC: 14 MMOL/L — SIGNIFICANT CHANGE UP (ref 5–17)
BASOPHILS # BLD AUTO: 0.05 K/UL — SIGNIFICANT CHANGE UP (ref 0–0.2)
BASOPHILS NFR BLD AUTO: 0.5 % — SIGNIFICANT CHANGE UP (ref 0–2)
BUN SERPL-MCNC: 15 MG/DL — SIGNIFICANT CHANGE UP (ref 7–23)
CALCIUM SERPL-MCNC: 10.1 MG/DL — SIGNIFICANT CHANGE UP (ref 8.4–10.5)
CHLORIDE SERPL-SCNC: 101 MMOL/L — SIGNIFICANT CHANGE UP (ref 96–108)
CO2 SERPL-SCNC: 26 MMOL/L — SIGNIFICANT CHANGE UP (ref 22–31)
CREAT SERPL-MCNC: 0.73 MG/DL — SIGNIFICANT CHANGE UP (ref 0.5–1.3)
EOSINOPHIL # BLD AUTO: 0.13 K/UL — SIGNIFICANT CHANGE UP (ref 0–0.5)
EOSINOPHIL NFR BLD AUTO: 1.2 % — SIGNIFICANT CHANGE UP (ref 0–6)
GLUCOSE SERPL-MCNC: 142 MG/DL — HIGH (ref 70–99)
HCT VFR BLD CALC: 47.5 % — HIGH (ref 34.5–45)
HGB BLD-MCNC: 15.6 G/DL — HIGH (ref 11.5–15.5)
IMM GRANULOCYTES NFR BLD AUTO: 0.4 % — SIGNIFICANT CHANGE UP (ref 0–1.5)
LYMPHOCYTES # BLD AUTO: 27.5 % — SIGNIFICANT CHANGE UP (ref 13–44)
LYMPHOCYTES # BLD AUTO: 3 K/UL — SIGNIFICANT CHANGE UP (ref 1–3.3)
MCHC RBC-ENTMCNC: 28.3 PG — SIGNIFICANT CHANGE UP (ref 27–34)
MCHC RBC-ENTMCNC: 32.8 GM/DL — SIGNIFICANT CHANGE UP (ref 32–36)
MCV RBC AUTO: 86.1 FL — SIGNIFICANT CHANGE UP (ref 80–100)
MONOCYTES # BLD AUTO: 0.51 K/UL — SIGNIFICANT CHANGE UP (ref 0–0.9)
MONOCYTES NFR BLD AUTO: 4.7 % — SIGNIFICANT CHANGE UP (ref 2–14)
NEUTROPHILS # BLD AUTO: 7.18 K/UL — SIGNIFICANT CHANGE UP (ref 1.8–7.4)
NEUTROPHILS NFR BLD AUTO: 65.7 % — SIGNIFICANT CHANGE UP (ref 43–77)
NRBC # BLD: 0 /100 WBCS — SIGNIFICANT CHANGE UP (ref 0–0)
PLATELET # BLD AUTO: 399 K/UL — SIGNIFICANT CHANGE UP (ref 150–400)
POTASSIUM SERPL-MCNC: 4.7 MMOL/L — SIGNIFICANT CHANGE UP (ref 3.5–5.3)
POTASSIUM SERPL-SCNC: 4.7 MMOL/L — SIGNIFICANT CHANGE UP (ref 3.5–5.3)
RBC # BLD: 5.52 M/UL — HIGH (ref 3.8–5.2)
RBC # FLD: 11.9 % — SIGNIFICANT CHANGE UP (ref 10.3–14.5)
SODIUM SERPL-SCNC: 141 MMOL/L — SIGNIFICANT CHANGE UP (ref 135–145)
TSH SERPL-MCNC: 6.91 UIU/ML — HIGH (ref 0.27–4.2)
WBC # BLD: 10.91 K/UL — HIGH (ref 3.8–10.5)
WBC # FLD AUTO: 10.91 K/UL — HIGH (ref 3.8–10.5)

## 2020-02-29 PROCEDURE — 84443 ASSAY THYROID STIM HORMONE: CPT

## 2020-02-29 PROCEDURE — 83605 ASSAY OF LACTIC ACID: CPT

## 2020-02-29 PROCEDURE — 99238 HOSP IP/OBS DSCHRG MGMT 30/<: CPT

## 2020-02-29 PROCEDURE — 82330 ASSAY OF CALCIUM: CPT

## 2020-02-29 PROCEDURE — 80048 BASIC METABOLIC PNL TOTAL CA: CPT

## 2020-02-29 PROCEDURE — 84100 ASSAY OF PHOSPHORUS: CPT

## 2020-02-29 PROCEDURE — 80053 COMPREHEN METABOLIC PANEL: CPT

## 2020-02-29 PROCEDURE — 82435 ASSAY OF BLOOD CHLORIDE: CPT

## 2020-02-29 PROCEDURE — 85027 COMPLETE CBC AUTOMATED: CPT

## 2020-02-29 PROCEDURE — 93971 EXTREMITY STUDY: CPT

## 2020-02-29 PROCEDURE — 73700 CT LOWER EXTREMITY W/O DYE: CPT

## 2020-02-29 PROCEDURE — 82947 ASSAY GLUCOSE BLOOD QUANT: CPT

## 2020-02-29 PROCEDURE — 99285 EMERGENCY DEPT VISIT HI MDM: CPT | Mod: 25

## 2020-02-29 PROCEDURE — G0378: CPT

## 2020-02-29 PROCEDURE — 84132 ASSAY OF SERUM POTASSIUM: CPT

## 2020-02-29 PROCEDURE — 85014 HEMATOCRIT: CPT

## 2020-02-29 PROCEDURE — 83735 ASSAY OF MAGNESIUM: CPT

## 2020-02-29 PROCEDURE — 96374 THER/PROPH/DIAG INJ IV PUSH: CPT

## 2020-02-29 PROCEDURE — 86803 HEPATITIS C AB TEST: CPT

## 2020-02-29 PROCEDURE — 87040 BLOOD CULTURE FOR BACTERIA: CPT

## 2020-02-29 PROCEDURE — 82803 BLOOD GASES ANY COMBINATION: CPT

## 2020-02-29 PROCEDURE — 84295 ASSAY OF SERUM SODIUM: CPT

## 2020-02-29 PROCEDURE — 85730 THROMBOPLASTIN TIME PARTIAL: CPT

## 2020-02-29 PROCEDURE — 80202 ASSAY OF VANCOMYCIN: CPT

## 2020-02-29 PROCEDURE — 85610 PROTHROMBIN TIME: CPT

## 2020-02-29 RX ORDER — APIXABAN 2.5 MG/1
2 TABLET, FILM COATED ORAL
Qty: 12 | Refills: 0
Start: 2020-02-29 | End: 2020-03-02

## 2020-02-29 RX ADMIN — Medication 125 MICROGRAM(S): at 05:37

## 2020-02-29 RX ADMIN — APIXABAN 10 MILLIGRAM(S): 2.5 TABLET, FILM COATED ORAL at 05:37

## 2020-02-29 RX ADMIN — Medication 2.5 MILLIGRAM(S): at 05:37

## 2020-02-29 RX ADMIN — Medication 250 MILLIGRAM(S): at 05:38

## 2020-02-29 NOTE — PROGRESS NOTE ADULT - ASSESSMENT
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  Exam also suggestive of cellulitis.   Vascular surgery input noted - redness felt to more from ablation rather than infection  However, clinically, suspect cellulitis; redness, heat & edema appear to be decreasing on antibiotics, so I do suspect a component of cellulitis     Recommendations:  Stop IV vancomycin - switch to PO Doxycycline from tonight for additional 5 days of antibiotics  Continue anticoagulation   Follow up with vascular surgery - given palpable & tender DVT in the thigh area.

## 2020-02-29 NOTE — DISCHARGE NOTE NURSING/CASE MANAGEMENT/SOCIAL WORK - PATIENT PORTAL LINK FT
You can access the FollowMyHealth Patient Portal offered by [facility] by registering at the following website: [email or web address]. By joining [employer]’s FollowMyHealth portal, you will also be able to view your health information using other applications (apps) compatible with our system.

## 2020-02-29 NOTE — PROGRESS NOTE ADULT - REASON FOR ADMISSION
RLE swelling and pain

## 2020-02-29 NOTE — CHART NOTE - NSCHARTNOTEFT_GEN_A_CORE
Discharge Note:    Requested by Dr. Gomez to facilitate d/c home. V/s, labs, diagnostics reviewed, pt stable to d/c now. Medication reconciliation reviewed, revised, and resolved with Dr. Gomez who medically cleared w/ f/u as directed. Please refer to d/c note for hospital details.    Esa Hunt  Spectra-link [id number]

## 2020-02-29 NOTE — CHART NOTE - NSCHARTNOTEFT_GEN_A_CORE
Discussed with Vascular as per Dr. Gomez and pt was cleared for discharge from vascular standpoint with f/u with Dr. Isidro in one week on 2/26/20.     Esa Hunt  Spectra-link [phone]

## 2020-02-29 NOTE — CHART NOTE - NSCHARTNOTEFT_GEN_A_CORE
Called by RN to report pt with c/o pain to IV lock site. Pt seen at bedside. States she has some pain that is improving. No redness, swelling or signs of infection noted to site.    BRIONNA Huntc  Spectra-link [phone]

## 2020-02-29 NOTE — PROGRESS NOTE ADULT - SUBJECTIVE AND OBJECTIVE BOX
CC: f/u for  cellulitis and phlebitis    Patient reports that redness of her thigh has improved but area still feels hard & she is nervous    REVIEW OF SYSTEMS:  All other review of systems negative (Comprehensive ROS)    Antimicrobials Day #  : 5  vancomycin  IVPB 1000 milliGRAM(s) IV Intermittent every 12 hours    Other Medications Reviewed    T(F): 97.5 (02-29-20 @ 05:23), Max: 97.7 (02-28-20 @ 12:30)  HR: 95 (02-29-20 @ 05:23)  BP: 150/87 (02-29-20 @ 05:23)  RR: 18 (02-29-20 @ 05:23)  SpO2: 96% (02-29-20 @ 05:23)  Wt(kg): --    PHYSICAL EXAM:  General: alert, no acute distress  Eyes:  anicteric, no conjunctival injection, no discharge  Oropharynx: no lesions or injection 	  Neck: supple, without adenopathy  Lungs: clear to auscultation  Heart: regular rate and rhythm; no murmurs  Abdomen: soft, nondistended, nontender, without mass or organomegaly  Skin: no lesions  Extremities: no clubbing, cyanosis, or edema                   Right medial thigh redness nearly resolved, cord is fully palpable, tender & indurated  Neurologic: alert, oriented, moves all extremities    LAB RESULTS:                        15.6   10.91 )-----------( 399      ( 29 Feb 2020 10:01 )             47.5     02-29    141  |  101  |  15  ----------------------------<  142<H>  4.7   |  26  |  0.73    Ca    10.1      29 Feb 2020 10:01          MICROBIOLOGY:  RECENT CULTURES:  02-24 @ 23:02 .Blood Blood     No growth to date.      RADIOLOGY REVIEWED:

## 2020-02-29 NOTE — PROGRESS NOTE ADULT - SUBJECTIVE AND OBJECTIVE BOX
Patient is a 54y old  Female who presents with a chief complaint of RLE swelling and pain (29 Feb 2020 10:59)      SUBJECTIVE / OVERNIGHT EVENTS: feels better. Less pain R thigh. No chills.  Review of Systems  chest pain no  palpitations no  sob no  nausea no  headache no    MEDICATIONS  (STANDING):  apixaban 10 milliGRAM(s) Oral every 12 hours  doxycycline hyclate Capsule 100 milliGRAM(s) Oral every 12 hours  enalapril 2.5 milliGRAM(s) Oral daily  influenza   Vaccine 0.5 milliLiter(s) IntraMuscular once  levothyroxine 125 MICROGram(s) Oral daily    MEDICATIONS  (PRN):  acetaminophen   Tablet .. 650 milliGRAM(s) Oral every 6 hours PRN Mild Pain (1 - 3)      Vital Signs Last 24 Hrs  T(C): 36.8 (29 Feb 2020 12:41), Max: 36.8 (29 Feb 2020 12:41)  T(F): 98.2 (29 Feb 2020 12:41), Max: 98.2 (29 Feb 2020 12:41)  HR: 83 (29 Feb 2020 12:41) (67 - 95)  BP: 151/88 (29 Feb 2020 12:41) (127/77 - 151/88)  BP(mean): --  RR: 18 (29 Feb 2020 12:41) (18 - 18)  SpO2: 96% (29 Feb 2020 12:41) (95% - 96%)    PHYSICAL EXAM:  GENERAL: NAD, well-developed  HEAD:  Atraumatic, Normocephalic  EYES: EOMI, PERRLA, conjunctiva and sclera clear  NECK: Supple, No JVD  CHEST/LUNG: Clear to auscultation bilaterally; No wheeze  HEART: Regular rate and rhythm; No murmurs, rubs, or gallops  ABDOMEN: Soft, Nontender, Nondistended; Bowel sounds present  EXTREMITIES:  R thigh with less erythema. Palpable cord.  PSYCH: Anxoius  NEUROLOGY: non-focal  SKIN: No rashes or lesions    LABS:                        15.6   10.91 )-----------( 399      ( 29 Feb 2020 10:01 )             47.5     02-29    141  |  101  |  15  ----------------------------<  142<H>  4.7   |  26  |  0.73    Ca    10.1      29 Feb 2020 10:01                  RADIOLOGY & ADDITIONAL TESTS:    Imaging Personally Reviewed:    Consultant(s) Notes Reviewed:      Care Discussed with Consultants/Other Providers:

## 2020-02-29 NOTE — PROGRESS NOTE ADULT - ASSESSMENT
54F with PMH of HTN, ovarian cancer (s/p total abdominal hysterectomy 2012), breast cancer s/p L mastectomy (2019) p/w RLE swelling and pain, admitted with acute DVT and overlying cellulitis.     Cellulitis of right lower extremity improving .   - antibiotics. Change to oral Doxycycline for next 5 days.   - ID follow noted.     Acute deep vein thrombosis (DVT) of femoral vein of right lower extremity. Possibly related to surgical procedure.   - pt with new DVT in RLE along saphenous extending to common femoral  - Continue Eliquis  - Vascular follow.     hx of uterine and breast ca and being worked up for cervical lesion  - MRI Abdomen and Pelvis as OTP.      Hypothyroidism due to Hashimoto's thyroiditis.  - c/w home dose synthroid 125mcg.     Essential hypertension.  - BP currently controlled   - c/w home medications - enalapril 2.5mg daily.     DC home. Vascular  cleared. Follow with PMD/ ID/ Vascular in 1 week. Rest for 1-2 weeks. QA   Dustin Gomez MD pager [phone]

## 2020-03-01 LAB
CULTURE RESULTS: SIGNIFICANT CHANGE UP
CULTURE RESULTS: SIGNIFICANT CHANGE UP
SPECIMEN SOURCE: SIGNIFICANT CHANGE UP
SPECIMEN SOURCE: SIGNIFICANT CHANGE UP

## 2020-03-02 ENCOUNTER — TRANSCRIPTION ENCOUNTER (OUTPATIENT)
Age: 55
End: 2020-03-02

## 2020-03-02 PROBLEM — Z00.00 ENCOUNTER FOR PREVENTIVE HEALTH EXAMINATION: Status: ACTIVE | Noted: 2020-03-02

## 2020-03-02 RX ORDER — APIXABAN 5 MG/1
5 TABLET, FILM COATED ORAL
Qty: 14 | Refills: 0 | Status: ACTIVE | COMMUNITY
Start: 2020-03-02 | End: 1900-01-01

## 2020-03-06 ENCOUNTER — APPOINTMENT (OUTPATIENT)
Dept: VASCULAR SURGERY | Facility: CLINIC | Age: 55
End: 2020-03-06
Payer: COMMERCIAL

## 2020-03-06 VITALS
TEMPERATURE: 98 F | HEART RATE: 90 BPM | WEIGHT: 194 LBS | SYSTOLIC BLOOD PRESSURE: 154 MMHG | BODY MASS INDEX: 34.38 KG/M2 | DIASTOLIC BLOOD PRESSURE: 80 MMHG | HEIGHT: 63 IN

## 2020-03-06 DIAGNOSIS — I87.009 POSTTHROMBOTIC SYNDROME W/OUT COMPLICATIONS OF UNSPECIFIED EXTREMITY: ICD-10-CM

## 2020-03-06 DIAGNOSIS — I82.401 ACUTE EMBOLISM AND THROMBOSIS OF UNSPECIFIED DEEP VEINS OF RIGHT LOWER EXTREMITY: ICD-10-CM

## 2020-03-06 DIAGNOSIS — I87.2 VENOUS INSUFFICIENCY (CHRONIC) (PERIPHERAL): ICD-10-CM

## 2020-03-06 DIAGNOSIS — I82.4Y1 ACUTE EMBOLISM AND THROMBOSIS OF UNSPECIFIED DEEP VEINS OF RIGHT PROXIMAL LOWER EXTREMITY: ICD-10-CM

## 2020-03-06 DIAGNOSIS — I83.893 VARICOSE VEINS OF BILATERAL LOWER EXTREMITIES WITH OTHER COMPLICATIONS: ICD-10-CM

## 2020-03-06 PROCEDURE — 93970 EXTREMITY STUDY: CPT

## 2020-03-06 PROCEDURE — 99214 OFFICE O/P EST MOD 30 MIN: CPT

## 2020-03-06 NOTE — PHYSICAL EXAM
[Normal Breath Sounds] : Normal breath sounds [JVD] : no jugular venous distention  [Right Carotid Bruit] : no bruit heard over the right carotid [Left Carotid Bruit] : no bruit heard over the left carotid [1+] : left 1+ [2+] : left 2+ [Ankle Swelling (On Exam)] : present [Ankle Swelling Bilaterally] : bilaterally  [Varicose Veins Of Lower Extremities] : bilaterally [] : bilaterally [Ankle Swelling On The Right] : mild [Abdomen Masses] : No abdominal masses [No HSM] : no hepatosplenomegaly [Tender] : was nontender [Stool Sample Taken] : No stool obtained  on rectal exam [No Rash or Lesion] : No rash or lesion [Alert] : alert [Oriented to Person] : oriented to person [Oriented to Place] : oriented to place [Oriented to Time] : oriented to time [Calm] : calm [de-identified] : nad [de-identified] : wnl [FreeTextEntry1] : Moderate bilateral leg venous insufficiency \par w mild bilateral leg stasis dermatitis,hyperkeratosis (skin thickening)\par right medial thigh ecchimotic skin changes w palp cord of GSV \par and mild bilateral  leg edema \par Multiple right leg small  left leg medium tortuous varicose  veins and spider veins  ant medial thigh and calf and shin \par RLE Varicose veins measuring  2mm in size on the thigh/calf /shin \par LLE Varicose veins measuring  3-4 mm in size on the thigh/calf /shin \par no wounds/ulcers\par no cellulitis \par Mild arterial insufficiency w mild  trophic skin changes \par \par \par  [de-identified] : wnl [de-identified] : wnl [de-identified] : Rufus Cranial nerves 2-12 rufus grossly intact [de-identified] : cooperative

## 2020-03-06 NOTE — DATA REVIEWED
[FreeTextEntry1] : Outside facility report reviewed [facility] RLE venous Duplex sig for  gsv thrombus to SFJ \par \par 3/6/2020 Venous Doppler Rufus LE  no acute dvt svt \par                            RLE GSV ablated no evidence of svt or dvt , no thrombus evidence in sfj \par                            LLE GSV  insuff from sfj to  knee  level

## 2020-03-06 NOTE — HISTORY OF PRESENT ILLNESS
[FreeTextEntry1] : Feb 19 2020  as per pt RLE vein ablation at vein center [facility]\par onset after this right groin and medial thigh pain \par pt was  admitted to [facility] for rle cellultiis and rle dvt and was rx w iv abx and anticoag rx \par pt was started on iv vanco 5 days and was changed to doxy which ended  3/5/2020 \par pt is currently on Eliquis 5  bid \par pt also c/o right  medial thigh color changes w  tenderness and discomfort \par \par Pt c/o bilateral leg swelling worse w work shift \par Onset sev years \par Intensity mild \par Pt denies recent injury, travel or thrombophilic event\par pt is employed as a RN \par \par Pt c/o left leg and foot nocturnal cramps 1x/week\par Intensity moderate \par Pt denies recent injury, travel\par \par \par \par \par

## 2020-03-06 NOTE — ASSESSMENT
[FreeTextEntry1] : Impression mild symptomatic arterial insuff , venous insuff w rle post procedural gsv thrombus s/p anticoag w resolution of thrombus  s/o rle post phleb syndrome \par \par \par Plan Med Conservative management leg elevation, knee high compression stockings  20-30mm Hg (rx given), wt loss, diet control, exercise program, protective measures\par continue eliquis till last dose luis fernando sat 3/7 then 3/8 am start baby asa \par Indications risks and benefits of lle GSV  RFA and   Left leg stab phlebectomy were explained to pt\par will re eval pt for this if  lle sx worsen\par ov 3 weeks to re eval  if any new rle sx then will repeat rle venous duplex s/o recurrence \par \par \par Varicose veins are enlarged, twisted veins. Varicose veins are caused by increased blood pressure in the veins.  The blood moves towards the heart by 1-way valves in the veins. When the valves become weakened or damaged, blood can collect in the veins and pool in your lower legs (ankles). This causes the veins to become enlarged and incompetent with reflux. Sitting or standing for long periods can cause blood to pool in the leg veins, increasing the pressure within the veins. \par Risk factors for varicose veins or venous disease may include:  obesity, older age, standing or sitting for prolonged periods of time for several years, being female, pregnancy, taking oral contraceptive pills or hormone replacement, being inactive, and/or smoking. \par The most common symptoms of varicose veins are sensations in the legs, such as a heavy feeling, burning, and/or aching. However, each individual may experience symptoms differently.  Other symptoms may include:  color changes in the skin, sores on the legs, or rash.  Severe varicose veins or venous disease may eventually produce long-term mild swelling that can result in more serious skin and tissue problems, such as ulcers and non-healing sores.\par Varicose veins and venous disease are diagnosed by a complete medical history, physical examination, and diagnostic studies for varicose veins including duplex ultrasound and color-flow imaging.  \par Medical treatment for varicose veins and venous disease include:  compression stockings, sclerotherapy, endovenous ablation and/or surgical treatment with microphlebectomy.  \par \par \par  [Arterial/Venous Disease] : arterial/venous disease [Medication Management] : medication management [Other: _____] : [unfilled]

## 2020-03-13 ENCOUNTER — APPOINTMENT (OUTPATIENT)
Dept: VASCULAR SURGERY | Facility: CLINIC | Age: 55
End: 2020-03-13

## 2020-03-26 ENCOUNTER — APPOINTMENT (OUTPATIENT)
Dept: VASCULAR SURGERY | Facility: CLINIC | Age: 55
End: 2020-03-26
Payer: COMMERCIAL

## 2020-03-26 PROCEDURE — 93971 EXTREMITY STUDY: CPT

## 2020-06-10 ENCOUNTER — APPOINTMENT (OUTPATIENT)
Dept: VASCULAR SURGERY | Facility: CLINIC | Age: 55
End: 2020-06-10

## 2022-05-27 ENCOUNTER — EMERGENCY (EMERGENCY)
Facility: HOSPITAL | Age: 57
LOS: 1 days | Discharge: HOME | End: 2022-05-27
Attending: EMERGENCY MEDICINE
Payer: COMMERCIAL

## 2022-05-27 VITALS
RESPIRATION RATE: 16 BRPM | HEIGHT: 63 IN | WEIGHT: 192.02 LBS | DIASTOLIC BLOOD PRESSURE: 84 MMHG | SYSTOLIC BLOOD PRESSURE: 123 MMHG | TEMPERATURE: 98 F | OXYGEN SATURATION: 98 % | HEART RATE: 72 BPM

## 2022-05-27 DIAGNOSIS — Z98.890 OTHER SPECIFIED POSTPROCEDURAL STATES: Chronic | ICD-10-CM

## 2022-05-27 DIAGNOSIS — Z90.12 ACQUIRED ABSENCE OF LEFT BREAST AND NIPPLE: Chronic | ICD-10-CM

## 2022-05-27 DIAGNOSIS — Z90.710 ACQUIRED ABSENCE OF BOTH CERVIX AND UTERUS: Chronic | ICD-10-CM

## 2022-05-27 PROCEDURE — 93970 EXTREMITY STUDY: CPT

## 2022-05-27 PROCEDURE — 99284 EMERGENCY DEPT VISIT MOD MDM: CPT | Mod: 25

## 2022-05-27 PROCEDURE — 99285 EMERGENCY DEPT VISIT HI MDM: CPT

## 2022-05-27 PROCEDURE — 93970 EXTREMITY STUDY: CPT | Mod: 26

## 2022-05-27 PROCEDURE — 70450 CT HEAD/BRAIN W/O DYE: CPT | Mod: MA

## 2022-05-27 PROCEDURE — 70450 CT HEAD/BRAIN W/O DYE: CPT | Mod: 26,MA

## 2022-05-27 NOTE — ED PROVIDER NOTE - PROGRESS NOTE DETAILS
CT perused due to complaint of leg weakness (although 5/5 strength on exam) with history of breast ca.  CT negative.  Patient agreeable to f/u with pcp return precautions provided to patient and patient agreeable.

## 2022-05-27 NOTE — ED PROVIDER NOTE - NSICDXPASTMEDICALHX_GEN_ALL_CORE_FT
PAST MEDICAL HISTORY:  Hypertension       Breast cancer     Deep vein blood clot of right lower extremity     Hypothyroid     Ovarian cancer     Varicose vein of leg

## 2022-05-27 NOTE — ED PROVIDER NOTE - HIV OFFER
8/7/21 at 2054: UCx prelim 50-99K GNR. Pt on doxy for chlamydia. Will await final sensitivities to determine need for contact vs appropriate care given. - Piotr Gonzalez PA-C
Opt out

## 2022-05-27 NOTE — ED ADULT TRIAGE NOTE - BP NONINVASIVE SYSTOLIC (MM HG)
Indication: ivf. Maternal age (28 years). ____________________________________________________________________________  History: Age: 28 years. Maternal age at [facility]: 28 years.  : 1 Para: 0.  ______________________________________________________ associated with advanced maternal age. She understands that ultrasound exam cannot exclude potential genetic abnormalities. /71   Pulse 114   Wt 161 lb (73 kg)   BMI 28.52 kg/m²   Alert and Oriented. No acute distress.   Abdomen: soft, nontend 123

## 2022-05-27 NOTE — ED PROVIDER NOTE - PATIENT PORTAL LINK FT
You can access the FollowMyHealth Patient Portal offered by [facility] by registering at the following website: [email or web address]. By joining [employer]’s FollowMyHealth portal, you will also be able to view your health information using other applications (apps) compatible with our system.

## 2022-05-27 NOTE — ED PROVIDER NOTE - NS ED ATTENDING STATEMENT MOD
This was a shared visit with the TRE. I reviewed and verified the documentation and independently performed the documented:

## 2022-05-27 NOTE — ED PROVIDER NOTE - NSFOLLOWUPINSTRUCTIONS_ED_ALL_ED_FT
Please call your primary doctor to inform them of this ER visit and obtain the next available appointment within the next 5 days. As we discussed, return to the ER if you have any worsening symptoms. - Bring copies of your results.    We no longer feel that you need further emergency care or admission to the hospital at this time.    While we have determined that you are currently stable for discharge, we know that things can change. Please seek immediate medical attention or return to the ER if you experience any of the following:  Any worsening or persistent symptoms  Severe Pain  Chest Pain  Difficulty Breathing  Bleeding  Passing Out  Severe Rash  Inability to Eat or Drink  Persistent Fever    Please see a primary care doctor or specialist within 5 days to ensure that you are improving.    Please call the [facility] phone numbers on this document if you have any problems obtaining a follow up appointment.

## 2022-05-27 NOTE — ED PROVIDER NOTE - PHYSICAL EXAMINATION
Musculoskeletal/Neuro: no focal tenderness to palpation of right calf; difficulty palpating DP pulse, however able to hear w/ handheld Doppler; 5/5 strength in leg, no sensory deficit on exam Musculoskeletal/Neuro: no focal tenderness to palpation of right calf; difficulty palpating DP pulse, however able to hear w/ handheld Doppler; 5/5 strength in leg, no sensory deficit on exam.

## 2022-05-27 NOTE — ED PROVIDER NOTE - OBJECTIVE STATEMENT
58 y/o female, history of ovarian and breast cancer, both in remission, hypothyroid, hypertension, presents for right calf pain w/ feeling of leg heaviness for the last 4 days. Patient states she wants to make sure that she doesn't have a blood clot in her leg. No fevers, chills. States had a previous blood clot after varicose vein surgery in 2019. No longer on any blood thinners. No known drug allergies.

## 2022-05-27 NOTE — ED ADULT NURSE NOTE - OBJECTIVE STATEMENT
States she has right calf pain x4 days .Denies chest pain ,shortness of breath or injury to right leg .Noted varicose veins to legs .

## 2022-07-25 RX ORDER — LEVOTHYROXINE SODIUM 125 MCG
1 TABLET ORAL
Qty: 0 | Refills: 0 | DISCHARGE

## 2022-07-25 RX ORDER — ASPIRIN/CALCIUM CARB/MAGNESIUM 324 MG
1 TABLET ORAL
Qty: 0 | Refills: 0 | DISCHARGE

## 2022-07-25 RX ORDER — CEPHALEXIN 500 MG
1 CAPSULE ORAL
Qty: 0 | Refills: 0 | DISCHARGE

## 2023-10-16 NOTE — ED PROVIDER NOTE - NS ED MD DISPO DIVISION
Received call from pt. Pt stated that he tried to schedule his MRIs, but was informed that they do not have open MRIs. He is very claustrophobic. Would prefer the open mri. Pt agreeable to going to [facility].  Will contact [facility] to schedule.    Orders faxed to [facility].  
[facility]

## 2023-12-05 NOTE — PATIENT PROFILE ADULT - BRADEN MOISTURE
Pt reports hx of DVT many years ago, previously with IVC filter which has now been removed. Most recent DVT was in 02/2023. Reports compliance with Xarelto  - Last dose of Xarelto on 12/3  - Hold AC at this time given concern for UGIB, get recs from GI regarding when to restart AC (4) rarely moist

## 2024-05-06 ENCOUNTER — EMERGENCY (EMERGENCY)
Facility: HOSPITAL | Age: 59
LOS: 1 days | Discharge: HOME | End: 2024-05-06
Attending: EMERGENCY MEDICINE
Payer: COMMERCIAL

## 2024-05-06 VITALS
SYSTOLIC BLOOD PRESSURE: 156 MMHG | HEIGHT: 65 IN | HEART RATE: 82 BPM | WEIGHT: 197.09 LBS | OXYGEN SATURATION: 97 % | RESPIRATION RATE: 17 BRPM | DIASTOLIC BLOOD PRESSURE: 87 MMHG

## 2024-05-06 DIAGNOSIS — Z90.12 ACQUIRED ABSENCE OF LEFT BREAST AND NIPPLE: Chronic | ICD-10-CM

## 2024-05-06 DIAGNOSIS — Z90.710 ACQUIRED ABSENCE OF BOTH CERVIX AND UTERUS: Chronic | ICD-10-CM

## 2024-05-06 DIAGNOSIS — Z98.890 OTHER SPECIFIED POSTPROCEDURAL STATES: Chronic | ICD-10-CM

## 2024-05-06 PROCEDURE — 99282 EMERGENCY DEPT VISIT SF MDM: CPT

## 2024-05-06 PROCEDURE — 99283 EMERGENCY DEPT VISIT LOW MDM: CPT

## 2024-05-06 NOTE — ED ADULT NURSE NOTE - NSFALLUNIVINTERV_ED_ALL_ED
Bed/Stretcher in lowest position, wheels locked, appropriate side rails in place/Call bell, personal items and telephone in reach/Instruct patient to call for assistance before getting out of bed/chair/stretcher/Non-slip footwear applied when patient is off stretcher/[place] to call system/Physically safe environment - no spills, clutter or unnecessary equipment/Purposeful proactive rounding/Room/bathroom lighting operational, light cord in reach

## 2024-05-06 NOTE — ED PROVIDER NOTE - OBJECTIVE STATEMENT
59 yr old female no hx presents to ed c/o left calf pain x 2 week worse past 2 days. 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 pain with weight bearing.

## 2024-05-06 NOTE — ED PROVIDER NOTE - NSFOLLOWUPINSTRUCTIONS_ED_ALL_ED_FT
possibly muscle strain vs DVT. no vascular tech at this time. informed patient to come back between 9a- 6p as the technician is here from 9a- 8p normal hours. meantime please use heat packs to area 3x/day 20 mins each session, take motrin 600mg every 6 hrs as needed, tylenol 650mg every 4 hrs as needed, stay active, no heavy lifting and return if symptoms  worsens

## 2024-05-06 NOTE — ED PROVIDER NOTE - MUSCULOSKELETAL, MLM
left calf ttp, mildly larger than contralateral side. able to dorsi and plantar flex. 2+ DP intact. no ecchymosis or deformity

## 2024-05-06 NOTE — ED PROVIDER NOTE - PATIENT PORTAL LINK FT
You can access the FollowMyHealth Patient Portal offered by [facility] by registering at the following website: [email or web address]. By joining [employer]’s FollowMyHealth portal, you will also be able to view your health information using other applications (apps) compatible with our system.

## 2024-05-06 NOTE — ED PROVIDER NOTE - CLINICAL SUMMARY MEDICAL DECISION MAKING FREE TEXT BOX
59 yr old female no hx presents to ed c/o left calf pain x 2 week worse past 2 days. 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 pain with weight bearing.    possibly muscle strain vs DVT. no vascular tech at this time. informed patient to come back between 9a- 6p as the technician is here from 9a- 8p normal hours. meantime please use heat packs to area 3x/day 20 mins each session, take motrin 600mg every 6 hrs as needed, tylenol 650mg every 4 hrs as needed, stay active, no heavy lifting and return if symptoms  worsens 59 yr old female no hx presents to ed c/o left calf pain x 2 week worse past 2 days. 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 pain with weight bearing.    possibly muscle strain vs DVT. no vascular tech at this time. informed patient to come back between 9a- 6p as the technician is here from 9a- 8p normal hours. meantime please use heat packs to area 3x/day 20 mins each session, take motrin 600mg every 6 hrs as needed, tylenol 650mg every 4 hrs as needed, stay active, no heavy lifting and return if symptoms  worsens    already paid for the co-pay

## 2024-05-07 ENCOUNTER — EMERGENCY (EMERGENCY)
Facility: HOSPITAL | Age: 59
LOS: 1 days | Discharge: HOME | End: 2024-05-07
Attending: EMERGENCY MEDICINE
Payer: COMMERCIAL

## 2024-05-07 VITALS
WEIGHT: 190.04 LBS | RESPIRATION RATE: 18 BRPM | DIASTOLIC BLOOD PRESSURE: 77 MMHG | OXYGEN SATURATION: 98 % | SYSTOLIC BLOOD PRESSURE: 165 MMHG | HEIGHT: 65 IN | HEART RATE: 78 BPM

## 2024-05-07 DIAGNOSIS — Z98.890 OTHER SPECIFIED POSTPROCEDURAL STATES: Chronic | ICD-10-CM

## 2024-05-07 DIAGNOSIS — Z90.710 ACQUIRED ABSENCE OF BOTH CERVIX AND UTERUS: Chronic | ICD-10-CM

## 2024-05-07 DIAGNOSIS — Z90.12 ACQUIRED ABSENCE OF LEFT BREAST AND NIPPLE: Chronic | ICD-10-CM

## 2024-05-07 PROBLEM — E03.9 HYPOTHYROIDISM, UNSPECIFIED: Chronic | Status: ACTIVE | Noted: 2020-02-25

## 2024-05-07 PROBLEM — I83.90 ASYMPTOMATIC VARICOSE VEINS OF UNSPECIFIED LOWER EXTREMITY: Chronic | Status: ACTIVE | Noted: 2022-05-27

## 2024-05-07 PROBLEM — I10 ESSENTIAL (PRIMARY) HYPERTENSION: Chronic | Status: ACTIVE | Noted: 2020-02-25

## 2024-05-07 PROBLEM — C50.919 MALIGNANT NEOPLASM OF UNSPECIFIED SITE OF UNSPECIFIED FEMALE BREAST: Chronic | Status: ACTIVE | Noted: 2022-05-27

## 2024-05-07 PROBLEM — C50.919 MALIGNANT NEOPLASM OF UNSPECIFIED SITE OF UNSPECIFIED FEMALE BREAST: Chronic | Status: ACTIVE | Noted: 2020-02-25

## 2024-05-07 PROBLEM — C55 MALIGNANT NEOPLASM OF UTERUS, PART UNSPECIFIED: Chronic | Status: ACTIVE | Noted: 2020-02-25

## 2024-05-07 PROBLEM — E03.9 HYPOTHYROIDISM, UNSPECIFIED: Chronic | Status: ACTIVE | Noted: 2022-05-27

## 2024-05-07 PROBLEM — C56.9 MALIGNANT NEOPLASM OF UNSPECIFIED OVARY: Chronic | Status: ACTIVE | Noted: 2022-05-27

## 2024-05-07 PROBLEM — I82.401 ACUTE EMBOLISM AND THROMBOSIS OF UNSPECIFIED DEEP VEINS OF RIGHT LOWER EXTREMITY: Chronic | Status: ACTIVE | Noted: 2022-05-27

## 2024-05-07 PROCEDURE — 93971 EXTREMITY STUDY: CPT

## 2024-05-07 PROCEDURE — 99284 EMERGENCY DEPT VISIT MOD MDM: CPT

## 2024-05-07 PROCEDURE — 93971 EXTREMITY STUDY: CPT | Mod: 26,LT

## 2024-05-07 PROCEDURE — 99284 EMERGENCY DEPT VISIT MOD MDM: CPT | Mod: 25

## 2024-05-07 RX ORDER — IBUPROFEN 200 MG
600 TABLET ORAL ONCE
Refills: 0 | Status: COMPLETED | OUTPATIENT
Start: 2024-05-07 | End: 2024-05-07

## 2024-05-07 NOTE — ED PROVIDER NOTE - PHYSICAL EXAMINATION
General: pt appears stated age and is not in distress  HEENT: AT/NC, pink conjunctiva, anicteric sclerae, EOMI   Neck: supple, full ROM, trachea midline  Lungs: symmetric excursion, no respiratory distress  Heart: rrr  Abd: nondistended  Extremities: no obvious deformities or fractures; minimal left calf ttp  Skin: no visualized rashes, petechiae, ecchymoses, or jaundice  Neuro: awake, alert, responsive; oriented to person, place and time; cranial nerves grossly intact, EOMI, intact jaw movement,  no facial asymmetry, hearing intact; no nystagmus, tongue midline; Motor: Normal tone in upper and lower extremities bilaterally  Sensory: intact; normal steady gait;

## 2024-05-07 NOTE — ED ADULT NURSE NOTE - OBJECTIVE STATEMENT
Patient presented to the ED complaining of left calf pain for 2 days. Was seen in the ED yesterday and came back today for ultrasound.

## 2024-05-07 NOTE — ED PROVIDER NOTE - CLINICAL SUMMARY MEDICAL DECISION MAKING FREE TEXT BOX
Pt w/ aforementioned presentation concerning for but not limited to DVT   Will get imaging, treat symptoms, monitor and reassess.    US negative. pt stable for dc home

## 2024-05-07 NOTE — ED PROVIDER NOTE - PATIENT PORTAL LINK FT
You can access the FollowMyHealth Patient Portal offered by [facility] by registering at the following website: [email or web address]. By joining [employer]’s FollowMyHealth portal, you will also be able to view your health information using other applications (apps) compatible with our system.

## 2024-05-07 NOTE — ED PROVIDER NOTE - OBJECTIVE STATEMENT
58 yo f here for LLE DVT US. Pt reports she was seen last night by Dr. North for left calf pain and was instructed to return this morning to have the US study done to r/o DVT. No trauma, f/c/ns or other complaints. Her pain has improved since last night

## 2024-12-06 NOTE — H&P ADULT - ASSESSMENT
cardiac precautions/fall precautions
54F with PMH of HTN, ovarian cancer (s/p total abdominal hysterectomy 2012), breast cancer s/p L mastectomy (2019) p/w RLE swelling and pain, admitted with acute DVT and overlying cellulitis.

## 2025-04-01 NOTE — ED PROVIDER NOTE - CROS ED CARDIOVAS ALL NEG
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  Patient is discouraged with the lack of pain relief that she has gotten since the cortisone injection a couple of weeks ago. Patient wishes to continue with physical therapy at this time since she reports usually feeling better following therapy, but is discouraged with the lack of progress thus far.     Pain / Symptoms  - Pain rating (out of 10): Current: 7       OBJECTIVE                                                                                                                    Observation   Increased swelling inferior to the patella medial and lateral to the patellar ligament                Treatment    Ultrasound  - Position: hooklying  - Duty Cycle: 100%  - Frequency: 1 Mhz  - Intensity (w/cm2): 1.0     - Intensity: patient comfort  - Duration: 10 minutes    Results: decreased pain  Reaction: no adverse reaction to treatment      Therapeutic Exercise            Manual Therapy   Supine grade 2 patellar glides medial, lateral, anterior and posterior x 6 minutes    Verbal informed consent received today for manual treatment. Permission was received before moving clothing. Patient was given alternative options. Benefits and drawbacks were explained. Patient provided continual consent prior to and during evaluation and treatment.      Treatment included effleurage (gliding strokes), friction (circular pressure) and passive and active  movements, and trigger point therapy as indicated to decrease pain and tightness through lateral knee near the fibular head and popliteal region of the upper gastrocnemius musculature.      Activities of Daily Living/Self Care  Patient educated to continue with home exercise program and to only complete exercises that are not bothersome  Patient educated to avoid extended walking or sitting at this time due to increased severity of symptoms with those activities    Skilled input: verbal instruction/cues    Writer verbally educated and received verbal consent for hand placement, positioning of patient, and techniques to be performed today from patient for clothing adjustments for techniques and therapist position for techniques as described above and how they are pertinent to the patient's plan of care.  Home Exercise Program  Access Code: [email or web address]  URL: [email or web address]  Date: 03/21/2025  Prepared by: Christophe Sam    Exercises  - Supine March  - 1 x daily - 7 x weekly - 3 sets - 10 reps  - Supine Heel Slide  - 1 x daily - 7 x weekly - 3 sets - 10 reps  - Side to Side Weight Shift with Counter Support  - 1 x daily - 7 x weekly - 3 sets - 10 reps  - Seated Hamstring Stretch  - 1 x daily - 7 x weekly - 3 sets - 10 reps      ASSESSMENT                                                                                                            Patient notes tenderness with soft tissue mobilization technique through the lateral aspect of tibiofemoral joint with no increase in pain in the popliteal region. Patellar glides are well tolerated in all directions except for lateral glide  which lead to a slight discomfort and reports of shooting pain going down toward the tibia. Ultrasound just distal to the patella did not increase symptoms today. Will continue to treat and monitor symptoms at this time to determine the most appropriate next step. Patient will continue to benefit from skilled  physical therapy services at this time to importance pain free active range of motion of the knee with activities of daily living.   Pain/symptoms after session (out of 10): 6  Education:   - Results of above outlined education: Verbalizes understanding and Demonstrates understanding    PLAN                                                                                                                           Suggestions for next session as indicated: Progress exercises per patient tolerance       Therapy procedure time and total treatment time can be found documented on the Time Entry flowsheet     - - -